# Patient Record
Sex: MALE | Race: WHITE | NOT HISPANIC OR LATINO | Employment: FULL TIME | URBAN - NONMETROPOLITAN AREA
[De-identification: names, ages, dates, MRNs, and addresses within clinical notes are randomized per-mention and may not be internally consistent; named-entity substitution may affect disease eponyms.]

---

## 2017-02-03 ENCOUNTER — HISTORY (OUTPATIENT)
Dept: FAMILY MEDICINE | Facility: OTHER | Age: 47
End: 2017-02-03

## 2017-02-03 ENCOUNTER — OFFICE VISIT - GICH (OUTPATIENT)
Dept: FAMILY MEDICINE | Facility: OTHER | Age: 47
End: 2017-02-03

## 2017-02-03 DIAGNOSIS — R31.9 HEMATURIA: ICD-10-CM

## 2017-02-03 DIAGNOSIS — Z00.00 ENCOUNTER FOR GENERAL ADULT MEDICAL EXAMINATION WITHOUT ABNORMAL FINDINGS: ICD-10-CM

## 2017-02-03 LAB
A/G RATIO - HISTORICAL: 1.6 (ref 1–2)
ALBUMIN SERPL-MCNC: 4.5 G/DL (ref 3.5–5.7)
ALP SERPL-CCNC: 43 IU/L (ref 34–104)
ALT (SGPT) - HISTORICAL: 37 IU/L (ref 7–52)
ANION GAP - HISTORICAL: 8 (ref 5–18)
AST SERPL-CCNC: 25 IU/L (ref 13–39)
BACTERIA URINE: NORMAL BACTERIA/HPF
BILIRUB SERPL-MCNC: 1.1 MG/DL (ref 0.3–1)
BILIRUB UR QL: NEGATIVE
BUN SERPL-MCNC: 14 MG/DL (ref 7–25)
BUN/CREAT RATIO - HISTORICAL: 15
CALCIUM SERPL-MCNC: 9.3 MG/DL (ref 8.6–10.3)
CHLORIDE SERPLBLD-SCNC: 101 MMOL/L (ref 98–107)
CHOL/HDL RATIO - HISTORICAL: 3.42
CHOLESTEROL TOTAL: 195 MG/DL
CLARITY, URINE: CLEAR CLARITY
CO2 SERPL-SCNC: 27 MMOL/L (ref 21–31)
COLOR UR: YELLOW COLOR
CREAT SERPL-MCNC: 0.92 MG/DL (ref 0.7–1.3)
EPITHELIAL CELLS: NORMAL EPI/HPF
GFR IF NOT AFRICAN AMERICAN - HISTORICAL: >60 ML/MIN/1.73M2
GLOBULIN - HISTORICAL: 2.8 G/DL (ref 2–3.7)
GLUCOSE SERPL-MCNC: 100 MG/DL (ref 70–105)
GLUCOSE URINE: NEGATIVE MG/DL
HDLC SERPL-MCNC: 57 MG/DL (ref 23–92)
KETONES UR QL: NEGATIVE MG/DL
LDLC SERPL CALC-MCNC: 118 MG/DL
LEUKOCYTE ESTERASE URINE: ABNORMAL
NITRITE UR QL STRIP: NEGATIVE
NON-HDL CHOLESTEROL - HISTORICAL: 138 MG/DL
OCCULT BLOOD,URINE - HISTORICAL: NEGATIVE
PATIENT STATUS - HISTORICAL: ABNORMAL
PH UR: 6 [PH]
POTASSIUM SERPL-SCNC: 4 MMOL/L (ref 3.5–5.1)
PROT SERPL-MCNC: 7.3 G/DL (ref 6.4–8.9)
PROTEIN QUALITATIVE,URINE - HISTORICAL: NEGATIVE MG/DL
RBC - HISTORICAL: NORMAL /HPF
SODIUM SERPL-SCNC: 136 MMOL/L (ref 133–143)
SP GR UR STRIP: 1.01
TRIGL SERPL-MCNC: 101 MG/DL
UROBILINOGEN,QUALITATIVE - HISTORICAL: NORMAL EU/DL
WBC - HISTORICAL: NORMAL /HPF

## 2017-02-03 ASSESSMENT — ANXIETY QUESTIONNAIRES
7. FEELING AFRAID AS IF SOMETHING AWFUL MIGHT HAPPEN: NOT AT ALL
3. WORRYING TOO MUCH ABOUT DIFFERENT THINGS: NOT AT ALL
GAD7 TOTAL SCORE: 0
2. NOT BEING ABLE TO STOP OR CONTROL WORRYING: NOT AT ALL
1. FEELING NERVOUS, ANXIOUS, OR ON EDGE: NOT AT ALL
6. BECOMING EASILY ANNOYED OR IRRITABLE: NOT AT ALL
4. TROUBLE RELAXING: NOT AT ALL
5. BEING SO RESTLESS THAT IT IS HARD TO SIT STILL: NOT AT ALL

## 2017-02-03 ASSESSMENT — PATIENT HEALTH QUESTIONNAIRE - PHQ9: SUM OF ALL RESPONSES TO PHQ QUESTIONS 1-9: 0

## 2017-02-06 ENCOUNTER — HISTORY (OUTPATIENT)
Dept: UROLOGY | Facility: OTHER | Age: 47
End: 2017-02-06

## 2017-02-06 ENCOUNTER — OFFICE VISIT - GICH (OUTPATIENT)
Dept: UROLOGY | Facility: OTHER | Age: 47
End: 2017-02-06

## 2017-02-06 DIAGNOSIS — R31.9 HEMATURIA: ICD-10-CM

## 2017-02-09 ENCOUNTER — HISTORY (OUTPATIENT)
Dept: UROLOGY | Facility: OTHER | Age: 47
End: 2017-02-09

## 2017-02-09 ENCOUNTER — ANTICOAGULATION - GICH (OUTPATIENT)
Dept: UROLOGY | Facility: OTHER | Age: 47
End: 2017-02-09

## 2017-02-09 ENCOUNTER — AMBULATORY - GICH (OUTPATIENT)
Dept: UROLOGY | Facility: OTHER | Age: 47
End: 2017-02-09

## 2017-02-09 ENCOUNTER — HOSPITAL ENCOUNTER (OUTPATIENT)
Dept: RADIOLOGY | Facility: OTHER | Age: 47
End: 2017-02-09
Attending: UROLOGY

## 2017-02-09 DIAGNOSIS — R31.9 HEMATURIA: ICD-10-CM

## 2017-02-23 ENCOUNTER — OFFICE VISIT - GICH (OUTPATIENT)
Dept: UROLOGY | Facility: OTHER | Age: 47
End: 2017-02-23

## 2017-02-23 ENCOUNTER — HISTORY (OUTPATIENT)
Dept: UROLOGY | Facility: OTHER | Age: 47
End: 2017-02-23

## 2017-02-23 DIAGNOSIS — R31.9 HEMATURIA: ICD-10-CM

## 2018-01-03 NOTE — PROGRESS NOTES
Patient Information     Patient Name MRN Sex Moses Rendon [id number] Male 1970      Progress Notes by Rosanna Wilson at 2017  7:50 AM     Author:  Rosanna Wilson Service:  (none) Author Type:  Other Clinical Staff     Filed:  2017  7:50 AM Date of Service:  2017  7:50 AM Status:  Signed     :  Rosanna Wilson (Other Clinical Staff)            1.  Has the patient had a previous reaction to IV contrast? No    2.  Does the patient have kidney disease? No    3.  Is the patient on dialysis? No    If YES to any of these questions, exam will be reviewed with a Radiologist before administering contrast.

## 2018-01-03 NOTE — NURSING NOTE
Patient Information     Patient Name MRN Moses Malik [id number] Male 1970      Nursing Note by Britney Cardenas at 2/3/2017  3:45 PM     Author:  Britney Cardenas Service:  (none) Author Type:  (none)     Filed:  2/3/2017  3:54 PM Encounter Date:  2/3/2017 Status:  Signed     :  Britney Cardenas            Patient here for physical, blood in the urine started yesterday. Pain on the right side today, no fevers. Last eye exam over 5 years and last dental exam 3 months ago. Britney Cardenas LPN .......................2/3/2017  3:48 PM

## 2018-01-03 NOTE — NURSING NOTE
Patient Information     Patient Name MRN Sex Moses Rendon [id number] Male 1970      Nursing Note by Ana Navarrete RN at 2017  2:30 PM     Author:  Ana Navarrete RN Service:  (none) Author Type:  NURS- Registered Nurse     Filed:  2017  2:38 PM Encounter Date:  2017 Status:  Signed     :  Ana Navarrete RN (NURS- Registered Nurse)            Review of Systems:    Weight loss:    No     Recent fever/chills:  No   Night sweats:   No  Current skin rash:  No   Recent hair loss:  No  Heat intolerance:  No   Cold intolerance:  No  Chest pain:   No   Palpitations:   No  Shortness of breath:  No   Wheezing:   Yes  Constipation:    No   Diarrhea:   No   Nausea:   No   Vomiting:   No   Kidney/side pain:  Yes   Back pain:   No  Frequent headaches:  No   Dizziness:     No  Leg swelling:   No   Calf pain:    No

## 2018-01-03 NOTE — NURSING NOTE
Patient Information     Patient Name MRN Sex Moses Rendon [id number] Male 1970      Nursing Note by Angela Westbrook RN at 2017  8:45 AM     Author:  Angela Westbrook RN Service:  (none) Author Type:  NURS- Registered Nurse     Filed:  2017  8:36 AM Encounter Date:  2017 Status:  Signed     :  Angela Westbrook RN (NURS- Registered Nurse)            Patient positioned in supine position, perineum area prepped with chlorhexidene Gluconate and patient draped per sterile technique. Per verbal order read back by Ignacio Smith MD, Urojet 10mL 2% lidocaine jelly to be instilled into urethra.  Urojet- 10ml 2% Lidocaine jelly instilled into the urethra.    Urojet 2%  Lot#: [id number]  Expiration date: 2018  : [employer]  NDC: [address]-2    [place] Protocol    A. Pre-procedure verification complete yes  1-relevant information / documentation available, reviewed and properly matched to the patient; 2-consent accurate and complete, 3-equipment and supplies available    B. Site marking complete N/A  Site marked if not in continuous attendance with patient    C. TIME OUT completed yes  Time Out was conducted just prior to starting procedure to verify the eight required elements: 1-patient identity, 2-consent accurate and complete, 3-position, 4-correct side/site marked (if applicable), 5-procedure, 6-relevant images / results properly labeled and displayed (if applicable), 7-antibiotics / irrigation fluids (if applicable), 8-safety precautions.    After procedure perineum area rinsed. Discharge instructions reviewed with patient. Patient verbalized understanding of discharge instructions and discharged ambulatory.

## 2018-01-03 NOTE — PROGRESS NOTES
Patient Information     Patient Name MRN Sex Moses Rendon [id number] Male 1970      Progress Notes by Rosanna Wilson at 2017  7:50 AM     Author:  Rosanna Wilson Service:  (none) Author Type:  Other Clinical Staff     Filed:  2017  7:50 AM Date of Service:  2017  7:50 AM Status:  Signed     :  Rosanna Wilson (Other Clinical Staff)            IV Contrast- Discharge Instructions After Your CT Scan      The IV contrast you received today will be filtered from your bloodstream by your kidneys during the next 24 hours and pass from the body in urine.  You will not be aware of this process and your urine will not change in color.  To help this process you should drink at least 4 additional glasses of water or juice today.  This reduces stress on your kidneys.    Most contrast reactions are immediate.  Should you develop symptoms of concern after discharge, contact the department at the number below.  After hours you should contact your personal physician.  If you develop breathing distress or wheezing, call 911.

## 2018-01-03 NOTE — PROGRESS NOTES
"Patient Information     Patient Name MRN Sex Moses Rendon [id number] Male 1970      Progress Notes by Patrick Bell MD at 2/3/2017  3:45 PM     Author:  Patrick Bell MD Service:  (none) Author Type:  Physician     Filed:  2017  2:20 PM Encounter Date:  2/3/2017 Status:  Signed     :  Patrick Bell MD (Physician)            SUBJECTIVE:    Moses Navarro is a 46 y.o. male who presents for physical    HPI Comments: Patient arrives here for a physical but also reports a case of hematuria. Patient states a few days ago he had \"blood in the urine\". States  reports that there was complete legally red. Settled in the toilet. He had not eaten any beats or taken any medications. Patient denies any pain. He denies any trauma.       No Known Allergies,   Family History       Problem   Relation Age of Onset     Diabetes  Father      type II       Hypertension  Father      Other  Father      a fib       Good Health  Mother      Good Health  Sister    ,   No current outpatient prescriptions on file prior to visit.     No current facility-administered medications on file prior to visit.    ,   Past Surgical History       Procedure   Laterality Date     Pr subtalar athroereisis        Ep study /ablation   2011     Atrial Fibrillation       Angiogram hand        angiogram right hand        Knee arthroscopy        Right knee     ,   Social History      Substance Use Topics        Smoking status:  Never Smoker     Smokeless tobacco:  Never Used     Alcohol use  3.6 oz/week     6 Cans of beer per week     and   Social History      Substance Use Topics        Smoking status:  Never Smoker     Smokeless tobacco:  Never Used     Alcohol use  3.6 oz/week     6 Cans of beer per week        REVIEW OF SYSTEMS:  ROS    OBJECTIVE:  /88  Pulse 56  Ht 1.816 m (5' 11.5\")  Wt 93.5 kg (206 lb 3.2 oz)  BMI 28.36 kg/m2    EXAM:   Physical Exam   Constitutional: He is well-developed, well-nourished, and " in no distress.   HENT:   Head: Normocephalic and atraumatic.   Right Ear: External ear normal.   Left Ear: External ear normal.   Mouth/Throat: No oropharyngeal exudate.   Eyes: Conjunctivae are normal. Pupils are equal, round, and reactive to light.   Neck: Normal range of motion.   Cardiovascular: Normal rate, regular rhythm and normal heart sounds.    No murmur heard.  Pulmonary/Chest: Effort normal and breath sounds normal.   Abdominal: Soft. Bowel sounds are normal.   Genitourinary: Rectum normal, prostate normal and penis normal.   Genitourinary Comments: No hernia   Musculoskeletal: Normal range of motion.   Neurological: He is alert. Gait normal.   Psychiatric: Affect normal.     Results for orders placed or performed in visit on 02/03/17       URINALYSIS W REFLEX MICROSCOPIC IF POSITIVE       Result  Value Ref Range Status    COLOR                     Yellow Yellow Color Final    CLARITY                   Clear Clear Clarity Final    SPECIFIC GRAVITY,URINE    1.010 1.010, 1.015, 1.020, 1.025                 Final    PH,URINE                  6.0 6.0, 7.0, 8.0, 5.5, 6.5, 7.5, 8.5                 Final    UROBILINOGEN,QUALITATIVE  Normal Normal EU/dl Final    PROTEIN, URINE Negative Negative mg/dL Final    GLUCOSE, URINE Negative Negative mg/dL Final    KETONES,URINE             Negative Negative mg/dL Final    BILIRUBIN,URINE           Negative Negative                 Final    OCCULT BLOOD,URINE        Negative Negative                 Final    NITRITE                   Negative Negative                 Final    LEUKOCYTE ESTERASE        Trace (A) Negative                 Final   URINALYSIS MICROSCOPIC       Result  Value Ref Range Status    RBC 0-2 0-2, None Seen /HPF Final    WBC 3-5 0-2, 3-5, None Seen /HPF Final    BACTERIA                  Few None Seen, Rare, Occasional, Few Bacteria/HPF Final    EPITHELIAL CELLS          Few None Seen, Few Epi/HPF Final   LIPID PANEL       Result  Value Ref Range  Status    CHOLESTEROL,TOTAL 195 <200 mg/dL Final    TRIGLYCERIDES 101 <150 mg/dL Final    HDL CHOLESTEROL 57 23 - 92 mg/dL Final    NON-HDL CHOLESTEROL 138 <145 mg/dl Final    CHOL/HDL RATIO            3.42 <4.50                 Final    LDL CHOLESTEROL 118 (H) <100 mg/dL Final    PATIENT STATUS            NOT GIVEN                 Final   COMP METABOLIC PANEL       Result  Value Ref Range Status    SODIUM 136 133 - 143 mmol/L Final    POTASSIUM 4.0 3.5 - 5.1 mmol/L Final    CHLORIDE 101 98 - 107 mmol/L Final    CO2,TOTAL 27 21 - 31 mmol/L Final    ANION GAP 8 5 - 18                 Final    GLUCOSE 100 70 - 105 mg/dL Final    CALCIUM 9.3 8.6 - 10.3 mg/dL Final    BUN 14 7 - 25 mg/dL Final    CREATININE 0.92 0.70 - 1.30 mg/dL Final    BUN/CREAT RATIO           15                 Final    GFR if African American >60 >60 ml/min/1.73m2 Final    GFR if not African American >60 >60 ml/min/1.73m2 Final    ALBUMIN 4.5 3.5 - 5.7 g/dL Final    PROTEIN,TOTAL 7.3 6.4 - 8.9 g/dL Final    GLOBULIN                  2.8 2.0 - 3.7 g/dL Final    A/G RATIO 1.6 1.0 - 2.0                 Final    BILIRUBIN,TOTAL 1.1 (H) 0.3 - 1.0 mg/dL Final    ALK PHOSPHATASE 43 34 - 104 IU/L Final    ALT (SGPT) 37 7 - 52 IU/L Final    AST (SGOT) 25 13 - 39 IU/L Final       ASSESSMENT/PLAN:    ICD-10-CM    1. Physical exam Z00.00 LIPID PANEL      COMP METABOLIC PANEL      LIPID PANEL      COMP METABOLIC PANEL   2. Hematuria R31.9 URINALYSIS W REFLEX MICROSCOPIC IF POSITIVE      URINALYSIS W REFLEX MICROSCOPIC IF POSITIVE      URINALYSIS MICROSCOPIC      URINALYSIS MICROSCOPIC      URINALYSIS W REFLEX MICROSCOPIC IF POSITIVE      AMB CONSULT TO UROLOGY        Plan:  Labs reviewed and satisfactory.  Even though the patient does not have any hematuria in the urine he certainly gives a good history for sudden onset of hematuria. We'll get a consultation with urology. Copies of labs will be sent to patient.

## 2018-01-03 NOTE — PROGRESS NOTES
Patient Information     Patient Name MRN Moses Malik [id number] Male 1970      Progress Notes by Ignacio Smith MD at 2017  2:30 PM     Author:  Ignacio Smith MD Service:  (none) Author Type:  Physician     Filed:  2017  3:25 PM Encounter Date:  2017 Status:  Signed     :  Ignacio Smith MD (Physician)            Type of Visit  EST    Chief Complaint  Hematuria    HPI  Mr. Navarro is a 46 y.o. male who follows up with gross hematuria following intercourse.  Gross hematuria started 3 weeks ago.  Episodes last about 1 day and then resolved spontaneously.  Total episodes are about 5.  Recently underwent a work up which was negative including a CTU and cystoscopy.    Patient denies associated dysuria at the time of onset.  Patient denies clots associated with hematuria.    Hematuria-related signs/symptoms  History of smoking?    No  History of chemotherapy?   No  History of pelvic radiation?   No  History of kidney or bladder stones?  No  History of frequent urinary tract infections? No      Family History  Family History       Problem   Relation Age of Onset     Diabetes  Father      type II       Hypertension  Father      Other  Father      a fib       Good Health  Mother      Good Health  Sister        Review of Systems  I personally reviewed the ROS with the patient.    Nursing Notes:   Ana Navarrete RN  2017  2:38 PM  Signed  Review of Systems:    Weight loss:    No     Recent fever/chills:  No   Night sweats:   No  Current skin rash:  No   Recent hair loss:  No  Heat intolerance:  No   Cold intolerance:  No  Chest pain:   No   Palpitations:   No  Shortness of breath:  No   Wheezing:   Yes  Constipation:    No   Diarrhea:   No   Nausea:   No   Vomiting:   No   Kidney/side pain:  Yes   Back pain:   No  Frequent headaches:  No   Dizziness:     No  Leg swelling:   No   Calf pain:    No      Physical Exam  Vitals:     17 1436   BP: 126/82   Pulse: 76   Resp: 12  "  Weight: 93.8 kg (206 lb 12.8 oz)   Height: 1.803 m (5' 11\")     Constitutional: No acute distress.  Alert and cooperative   Head: NCAT  Eyes: Conjunctivae normal  Cardiovascular: Regular rate.  Pulmonary/Chest: Respirations are even and non-labored bilaterally, no audible wheezing  Abdominal: Soft. No distension, tenderness, masses or guarding.   Neurological: A + O x 3.  Cranial Nerves II-XII grossly intact.  Extremities: RAUL x 4, Warm. No clubbing.  No cyanosis.    Skin: Pink, warm and dry.  No visible rashes noted.  Psychiatric:  Normal mood and affect  Back:  No left CVA tenderness.  No right CVA tenderness.  Genitourinary:  Nonpalpable bladder    Labs  CREATININE (mg/dL)    Date Value   02/03/2017 0.92       Recent Labs       02/03/17   1602   COLOR  Yellow   CLARITY  Clear   SPECGRAV  1.010   PHURINE  6.0   UROBILINOGEN  Normal   PROTEINUA  Negative       Recent Labs       02/03/17   1602   GLUCOSEUA  Negative   KETONESUA  Negative   BLOODUA  Negative   NITRITE  Negative   LEUKOCYTE  Trace A     Lab Results      Component  Value Date/Time    WBCUA 3-5 02/03/2017 04:02 PM    RBCUA 0-2 02/03/2017 04:02 PM    BACTERIAUA Few 02/03/2017 04:02 PM    EPITHELIALUA Few 02/03/2017 04:02 PM       Imaging  CTU  2/9/2017  IMPRESSION: No findings to explain the patient's hematuria.    Assessment  Mr. Navarro is a 46 y.o. male with post coital gross hematuria  Discussed likely etiology of gross hematuria following ejaculation.    Plan  No ejaculation x 2 weeks        "

## 2018-01-03 NOTE — PROGRESS NOTES
Patient Information     Patient Name MRN Sex Moses Rendon [id number] Male 1970      Progress Notes by Ignacio Smith MD at 2017  8:45 AM     Author:  Ignacio Smith MD Service:  (none) Author Type:  Physician     Filed:  2017  8:42 AM Encounter Date:  2017 Status:  Signed     :  Ignacio Smith MD (Physician)            Preprocedure diagnosis  Hematuria    Postprocedure diagnosis  Hematuria    Procedure  Flexible Cystourethroscopy    Surgeon  Ignacio Smith MD    Anesthesia  2% lidocaine jelly intraurethrally    Complications  None    Indications  46 y.o. male undergoing a flexible cystoscopy for the above mentioned indications.    Findings  Cystoscopic findings included a normal anterior urethra.    There was not a prominent median lobe.    The lateral lobes were not obstructive in appearance.  The bladder appeared to be normal capacity.    There were no tumors, stones or foreign bodies.    The orifices were slit-shaped and in their normal location.    Procedure  The patient was placed in supine position and prepped and draped in sterile fashion with lidocaine jelly per urethra for anesthesia.    I passed a lubricated 14F flexible cystoscope through the penile urethra and into the bladder and the bladder was completely visualized.  The cystoscope was retroflexed and the bladder neck and prostate visualized.    The cystoscope was slowly withdrawn while visualizing the urethra and the procedure terminated.    The patient tolerated the procedure well.      Plan  Reassurance

## 2018-01-03 NOTE — PATIENT INSTRUCTIONS
Patient Information     Patient Name Moses Roman [id number] Male 1970      Patient Instructions by Angela Westbrook RN at 2017  8:45 AM     Author:  Angela Westbrook RN Service:  (none) Author Type:  NURS- Registered Nurse     Filed:  2017  8:19 AM Encounter Date:  2017 Status:  Signed     :  Angela Westbrook RN (NURS- Registered Nurse)            Home Care after Cystoscopy  Follow these guidelines for your care after your procedure.    Activity  No limitations    Bathing or showering  No limitations    Symptoms  You may notice some burning with urination but this usually resolves after 1-2 days.  You may also notice small amounts of blood in your urine.  Please increase water intake for the next few days to help with these symptoms.    Contacts  General Questions: [phone]  Appointments:  [phone]  Emergencies:  [phone]    When to call the clinic  If you develop any of the following symptoms please call the clinic immediately.  If the clinic is closed please be seen at an urgent care clinic or the Emergency Department.  - Burning with urination that worsens after 2 days  - Unable to urinate causing severe pelvic pain  - Fevers of greater than 101 degrees F  - Flank pain that is not responding to pain medication    Follow up  Please follow up as discussed at the appointment.

## 2018-01-03 NOTE — PROGRESS NOTES
Patient Information     Patient Name MRMoses Ham [id number] Male 1970      Progress Notes by Ignacio Smith MD at 2017  1:15 PM     Author:  Ignacio Smith MD Service:  (none) Author Type:  Physician     Filed:  2017  2:08 PM Encounter Date:  2017 Status:  Signed     :  Ignacio Smith MD (Physician)            I was asked to see this patient by Dr Bell and provide my opinion about the following:  Hematuria    Type of Visit  Consult    Chief Complaint  Hematuria    HPI  Mr. Navarro is a 46 y.o. male who presents with hematuria.  Gross hematuria started 5 day(s) ago.  Episode lasted about 1 day and then resolved spontaneously.  This has happened one additional time since then.  Associated with intercourse.  He brought in a container of grossly bloody urine today to show me.  Patient denies associated dysuria at the time of onset.  Patient denies clots associated with hematuria.    Hematuria-related signs/symptoms  History of smoking?    No  History of chemotherapy?   No  History of pelvic radiation?   No  History of kidney or bladder stones?  No  History of frequent urinary tract infections? No      Past Medical History  He  has a past medical history of Atrial fibrillation and flutter (since 2011); Injury; Injury; Rotator cuff injury; and Ruptured spleen.  Patient Active Problem List     Diagnosis  Code     ATRIAL FLUTTER, PAROXYSMAL status post ablation I48.92     Crush injury of hand S67.20XA     INSOMNIA, PERSISTENT G47.00     SNORING R06.09, R09.89     Hematuria R31.9       Past Surgical History  He  has a past surgical history that includes subtalar athroereisis; ep study /ablation (2011); ANGIOGRAM HAND; and knee arthroscopy.    Medications  None    Allergies  No Known Allergies    Social History  He  reports that he has never smoked. He has never used smokeless tobacco. He reports that he drinks about 3.6 oz of alcohol per week  He reports that he does not use  illicit drugs.  No drug abuse.    Family History  Family History       Problem   Relation Age of Onset     Diabetes  Father      type II       Hypertension  Father      Other  Father      a fib       Good Health  Mother      Good Health  Sister        Review of Systems  I personally reviewed the ROS with the patient.    Nursing Notes:   Asia Prescott  2/6/2017  1:31 PM  Signed  Here for Hematuria..  Review of Systems:    Weight loss:    No     Recent fever/chills:  Yes   Night sweats:   No  Current skin rash:  No   Recent hair loss:  No  Heat intolerance:  No   Cold intolerance:  No  Chest pain:   No   Palpitations:   No  Shortness of breath:  No   Wheezing:   No  Constipation:    No   Diarrhea:   No   Nausea:   No   Vomiting:   No   Kidney/side pain:  No   Back pain:   No  Frequent headaches:  No   Dizziness:     No  Leg swelling:   No   Calf pain:    No        Parents, brothers or sisters with history of kidney cancer?   No  Parents, brothers or sisters with history of bladder cancer: No  Asia Prescott LPN  2/6/2017  1:18 PM            Physical Exam  Vitals:     02/06/17 1316   BP: 120/70   Resp: 12   Weight: 93.4 kg (206 lb)     Constitutional: No acute distress.  Alert and cooperative   Head: NCAT  Eyes: Conjunctivae normal  Cardiovascular: Regular rate.  Pulmonary/Chest: Respirations are even and non-labored bilaterally, no audible wheezing  Abdominal: Soft. No distension, tenderness, masses or guarding.   Neurological: A + O x 3.  Cranial Nerves II-XII grossly intact.  Extremities: RAUL x 4, Warm. No clubbing.  No cyanosis.    Skin: Pink, warm and dry.  No visible rashes noted.  Psychiatric:  Normal mood and affect  Back:  No left CVA tenderness.  No right CVA tenderness.  Genitourinary:  Nonpalpable bladder    Labs  CREATININE (mg/dL)    Date Value   02/03/2017 0.92       Recent Labs       02/03/17   1602   COLOR  Yellow   CLARITY  Clear   SPECGRAV  1.010   PHURINE  6.0   UROBILINOGEN  Normal    PROTEINUA  Negative       Recent Labs       02/03/17   1602   GLUCOSEUA  Negative   KETONESUA  Negative   BLOODUA  Negative   NITRITE  Negative   LEUKOCYTE  Trace A     Lab Results      Component  Value Date/Time    WBCUA 3-5 02/03/2017 04:02 PM    RBCUA 0-2 02/03/2017 04:02 PM    BACTERIAUA Few 02/03/2017 04:02 PM    EPITHELIALUA Few 02/03/2017 04:02 PM       Imaging  None    Assessment  Mr. Navarro is a 46 y.o. male with gross hematuria    Discussed rationale for work up.  Discussed potential findings of hematuria work up including, but not limited to, kidney stones, bladder tumors and/or kidney tumors.  Also discussed the potential for a normal work up.    Plan  CT Urogram with follow up for cystoscopy

## 2018-01-03 NOTE — NURSING NOTE
Patient Information     Patient Name MRMoses Ham [id number] Male 1970      Nursing Note by Asia Prescott at 2017  1:15 PM     Author:  Asia Prescott Service:  (none) Author Type:  (none)     Filed:  2017  1:31 PM Encounter Date:  2017 Status:  Signed     :  Asia Prescott            Here for Hematuria..  Review of Systems:    Weight loss:    No     Recent fever/chills:  Yes   Night sweats:   No  Current skin rash:  No   Recent hair loss:  No  Heat intolerance:  No   Cold intolerance:  No  Chest pain:   No   Palpitations:   No  Shortness of breath:  No   Wheezing:   No  Constipation:    No   Diarrhea:   No   Nausea:   No   Vomiting:   No   Kidney/side pain:  No   Back pain:   No  Frequent headaches:  No   Dizziness:     No  Leg swelling:   No   Calf pain:    No        Parents, brothers or sisters with history of kidney cancer?   No  Parents, brothers or sisters with history of bladder cancer: No  Asia Prescott LPN  2017  1:18 PM

## 2018-01-24 ENCOUNTER — HISTORY (OUTPATIENT)
Dept: FAMILY MEDICINE | Facility: OTHER | Age: 48
End: 2018-01-24

## 2018-01-24 ENCOUNTER — OFFICE VISIT - GICH (OUTPATIENT)
Dept: FAMILY MEDICINE | Facility: OTHER | Age: 48
End: 2018-01-24

## 2018-01-24 DIAGNOSIS — G89.29 OTHER CHRONIC PAIN: ICD-10-CM

## 2018-01-24 DIAGNOSIS — M54.41 LOW BACK PAIN WITH RIGHT-SIDED SCIATICA: ICD-10-CM

## 2018-01-24 DIAGNOSIS — L21.9 SEBORRHEIC DERMATITIS: ICD-10-CM

## 2018-01-26 VITALS
SYSTOLIC BLOOD PRESSURE: 126 MMHG | SYSTOLIC BLOOD PRESSURE: 120 MMHG | DIASTOLIC BLOOD PRESSURE: 82 MMHG | RESPIRATION RATE: 12 BRPM | HEIGHT: 71 IN | WEIGHT: 206.8 LBS | WEIGHT: 206 LBS | BODY MASS INDEX: 28.95 KG/M2 | RESPIRATION RATE: 12 BRPM | BODY MASS INDEX: 28.33 KG/M2 | DIASTOLIC BLOOD PRESSURE: 70 MMHG | HEART RATE: 76 BPM

## 2018-01-26 VITALS
WEIGHT: 206.2 LBS | HEIGHT: 72 IN | BODY MASS INDEX: 27.93 KG/M2 | SYSTOLIC BLOOD PRESSURE: 134 MMHG | DIASTOLIC BLOOD PRESSURE: 88 MMHG | HEART RATE: 56 BPM

## 2018-01-26 VITALS — HEIGHT: 71 IN | HEART RATE: 72 BPM | RESPIRATION RATE: 16 BRPM | WEIGHT: 207 LBS | BODY MASS INDEX: 28.98 KG/M2

## 2018-02-02 ASSESSMENT — PATIENT HEALTH QUESTIONNAIRE - PHQ9: SUM OF ALL RESPONSES TO PHQ QUESTIONS 1-9: 0

## 2018-02-02 ASSESSMENT — ANXIETY QUESTIONNAIRES: GAD7 TOTAL SCORE: 0

## 2018-02-09 VITALS
BODY MASS INDEX: 29.43 KG/M2 | SYSTOLIC BLOOD PRESSURE: 130 MMHG | TEMPERATURE: 97.6 F | WEIGHT: 211 LBS | DIASTOLIC BLOOD PRESSURE: 84 MMHG

## 2018-02-13 ENCOUNTER — DOCUMENTATION ONLY (OUTPATIENT)
Dept: FAMILY MEDICINE | Facility: OTHER | Age: 48
End: 2018-02-13

## 2018-02-13 PROBLEM — R31.9 HEMATURIA: Status: ACTIVE | Noted: 2017-02-03

## 2018-02-13 NOTE — NURSING NOTE
Patient Information     Patient Name MRN Moses Malik [id number] Male 1970      Nursing Note by Mary Conteh at 2018  3:45 PM     Author:  Mary Conteh Service:  (none) Author Type:  (none)     Filed:  2018  3:49 PM Encounter Date:  2018 Status:  Signed     :  Mary Conteh            Patient presents to the clinic with chronic back pain.  Mary Conteh LPN....................2018 3:43 PM

## 2018-02-13 NOTE — PROGRESS NOTES
Patient Information     Patient Name MRN Sex Moses Rendon [id number] Male 1970      Progress Notes by Patrick Bell MD at 2018  3:45 PM     Author:  Patrick Bell MD Service:  (none) Author Type:  Physician     Filed:  2018  7:41 AM Encounter Date:  2018 Status:  Signed     :  Patrick Bell MD (Physician)            SUBJECTIVE:    Moses Navarro is a 47 y.o. male who presents for lower back pain and rash    HPI Comments: Patient arrives here for lower back pain and rash. He's had a lower back pain for about a year and seems to be slowly getting worse. He's been trying stretching exercises at home. Occasional ibuprofen. He denies any injury. He does report pain going down the right leg. But he denies any weakness no changes in bowel or bladder habits. States his right butt hurts. He has sole has a rash on his right lower lid      No Known Allergies,   Family History       Problem   Relation Age of Onset     Diabetes  Father      type II       Hypertension  Father      Other  Father      a fib       Good Health  Mother      Good Health  Sister     and   No current outpatient prescriptions on file prior to visit.     No current facility-administered medications on file prior to visit.        REVIEW OF SYSTEMS:  ROS    OBJECTIVE:  /84 (Cuff Site: Right Arm, Position: Sitting, Cuff Size: Adult Large)  Temp 97.6  F (36.4  C) (Temporal)  Wt 95.7 kg (211 lb)  BMI 29.43 kg/m2    EXAM:   Physical Exam   Constitutional: He is well-developed, well-nourished, and in no distress.   Musculoskeletal: He exhibits no tenderness.   Able to walk on toes and heels squats without difficulty.   Neurological: He is alert. He displays normal reflexes. He exhibits normal muscle tone.   Skin:   Small scaling rash below the right lower lid.       ASSESSMENT/PLAN:    ICD-10-CM    1. Chronic right-sided low back pain with right-sided sciatica M54.41 etodolac (LODINE) 500 mg tablet      G89.29 AMB CONSULT TO PHYSICAL THERAPY   2. Seborrhea L21.9         Plan:  Physical therapy along with the Lodine. It appears his lesion below his right lower lid is seborrhea. Lungs recommend Cortaid. If no improvement follow-up. Follow-up after physical therapy completed.

## 2018-07-23 NOTE — PROGRESS NOTES
Patient Information     Patient Name  Moses Navarro MRN  [id number] Sex  Male   1970      Letter by Patrick Bell MD at      Author:  Patrick Bell MD Service:  (none) Author Type:  (none)    Filed:   Encounter Date:  2/3/2017 Status:  (Other)           Moses Navarro  [address] [facility] [place] [id number]          2017    Dear Mr. Navarro:    Enclosed is a copy of your laboratory testing which did come back satisfactory. Please call if you should have any questions.  Results for orders placed or performed in visit on 17       URINALYSIS W REFLEX MICROSCOPIC IF POSITIVE       Result  Value Ref Range Status    COLOR                     Yellow Yellow Color Final    CLARITY                   Clear Clear Clarity Final    SPECIFIC GRAVITY,URINE    1.010 1.010, 1.015, 1.020, 1.025                 Final    PH,URINE                  6.0 6.0, 7.0, 8.0, 5.5, 6.5, 7.5, 8.5                 Final    UROBILINOGEN,QUALITATIVE  Normal Normal EU/dl Final    PROTEIN, URINE Negative Negative mg/dL Final    GLUCOSE, URINE Negative Negative mg/dL Final    KETONES,URINE             Negative Negative mg/dL Final    BILIRUBIN,URINE           Negative Negative                 Final    OCCULT BLOOD,URINE        Negative Negative                 Final    NITRITE                   Negative Negative                 Final    LEUKOCYTE ESTERASE        Trace (A) Negative                 Final   URINALYSIS MICROSCOPIC       Result  Value Ref Range Status    RBC 0-2 0-2, None Seen /HPF Final    WBC 3-5 0-2, 3-5, None Seen /HPF Final    BACTERIA                  Few None Seen, Rare, Occasional, Few Bacteria/HPF Final    EPITHELIAL CELLS          Few None Seen, Few Epi/HPF Final   LIPID PANEL       Result  Value Ref Range Status    CHOLESTEROL,TOTAL 195 <200 mg/dL Final    TRIGLYCERIDES 101 <150 mg/dL Final    HDL CHOLESTEROL 57 23 - 92 mg/dL Final    NON-HDL CHOLESTEROL 138 <145 mg/dl Final     CHOL/HDL RATIO            3.42 <4.50                 Final    LDL CHOLESTEROL 118 (H) <100 mg/dL Final    PATIENT STATUS            NOT GIVEN                 Final   COMP METABOLIC PANEL       Result  Value Ref Range Status    SODIUM 136 133 - 143 mmol/L Final    POTASSIUM 4.0 3.5 - 5.1 mmol/L Final    CHLORIDE 101 98 - 107 mmol/L Final    CO2,TOTAL 27 21 - 31 mmol/L Final    ANION GAP 8 5 - 18                 Final    GLUCOSE 100 70 - 105 mg/dL Final    CALCIUM 9.3 8.6 - 10.3 mg/dL Final    BUN 14 7 - 25 mg/dL Final    CREATININE 0.92 0.70 - 1.30 mg/dL Final    BUN/CREAT RATIO           15                 Final    GFR if African American >60 >60 ml/min/1.73m2 Final    GFR if not African American >60 >60 ml/min/1.73m2 Final    ALBUMIN 4.5 3.5 - 5.7 g/dL Final    PROTEIN,TOTAL 7.3 6.4 - 8.9 g/dL Final    GLOBULIN                  2.8 2.0 - 3.7 g/dL Final    A/G RATIO 1.6 1.0 - 2.0                 Final    BILIRUBIN,TOTAL 1.1 (H) 0.3 - 1.0 mg/dL Final    ALK PHOSPHATASE 43 34 - 104 IU/L Final    ALT (SGPT) 37 7 - 52 IU/L Final    AST (SGOT) 25 13 - 39 IU/L Final     Patrick Bell MD ....................  2/5/2017   2:29 PM

## 2019-04-29 ENCOUNTER — OFFICE VISIT (OUTPATIENT)
Dept: FAMILY MEDICINE | Facility: OTHER | Age: 49
End: 2019-04-29
Attending: FAMILY MEDICINE
Payer: COMMERCIAL

## 2019-04-29 VITALS
WEIGHT: 201 LBS | HEART RATE: 78 BPM | DIASTOLIC BLOOD PRESSURE: 84 MMHG | BODY MASS INDEX: 28.03 KG/M2 | RESPIRATION RATE: 16 BRPM | TEMPERATURE: 97.8 F | SYSTOLIC BLOOD PRESSURE: 142 MMHG

## 2019-04-29 DIAGNOSIS — M54.16 RIGHT LUMBAR RADICULOPATHY: Primary | ICD-10-CM

## 2019-04-29 DIAGNOSIS — G47.8 WAKES UP DURING NIGHT: ICD-10-CM

## 2019-04-29 PROCEDURE — 99214 OFFICE O/P EST MOD 30 MIN: CPT | Performed by: FAMILY MEDICINE

## 2019-04-29 RX ORDER — AMITRIPTYLINE HYDROCHLORIDE 10 MG/1
10-20 TABLET ORAL AT BEDTIME
Qty: 60 TABLET | Refills: 1 | Status: SHIPPED | OUTPATIENT
Start: 2019-04-29 | End: 2020-02-03

## 2019-04-29 ASSESSMENT — PAIN SCALES - GENERAL: PAINLEVEL: MILD PAIN (2)

## 2019-04-29 NOTE — PATIENT INSTRUCTIONS
Ordered MRI on low back. [facility] calls to schedule.  We can let you know results  Referral to physical therapy to work on improving pain and function    Try amitriptyline 10 mg each night for a week, if not effective, then increase to 20 mg each night  Call if side effects or questions or if not effective  Follow-up in 1-2 months if working

## 2019-04-29 NOTE — NURSING NOTE
Pt presents to clinic today for ongoing right sided low back pain that radiated down his leg.      Medication Reconciliation: complete  Ana Peres LPN

## 2019-04-29 NOTE — PROGRESS NOTES
"Nursing Notes:   Ana Peres, LPN  4/29/2019  4:13 PM  Signed  Pt presents to clinic today for ongoing right sided low back pain that radiated down his leg.      Medication Reconciliation: complete  Ana Peres LPN     SUBJECTIVE:  48 year old male presents for right leg numbness for a couple years. It has worsened over time. Has a \"tight lower back.\" Pain will go to knee. Has tingling in foot at times. Still working out and plays [employer]. No previous lumbar injury or surgery. Has not noticed weakness in leg. Has not tried chiropractor or PT.    Also, wondering about trying something for insomnia. He has used Ambien in the past, but it did nothing. He falls asleep fine, but wakes up often in the night. Cannot say waking is from back pain though.       REVIEW OF SYSTEMS:    Constitutional: negative  Respiratory: negative  Cardiovascular: negative    No current outpatient medications on file.     No Known Allergies    OBJECTIVE:  /84   Pulse 78   Temp 97.8  F (36.6  C) (Tympanic)   Resp 16   Wt 91.2 kg (201 lb)   BMI 28.03 kg/m      EXAM:  General Appearance: Pleasant, alert, appropriate appearance for age. No acute distress  Musculoskeletal Exam: Lumbar Spine: tenderness over lumbar paraspinous muscles on the right. No SI tenderness.  Neurologic Exam: reflexes 2+, strength symmetric lower extremities; SLR negative bilaterally  Psychiatric: Normal affect and mentation      ASSESSMENT/PLAN:    ICD-10-CM    1. Right lumbar radiculopathy M54.16 MR Lumbar Spine w/o Contrast     PHYSICAL THERAPY REFERRAL     amitriptyline (ELAVIL) 10 MG tablet   2. Wakes up during night G47.8 amitriptyline (ELAVIL) 10 MG tablet       Sounds to have a lumbar radiculopathy, but pain is proximal and numbness is distal. He could have some facet arthropathy and peripheral neuropathy, but more likely nerve impingement explains symptoms. We discussed PT or chiropractor as beneficial. He exercises and may do very well with PT " exercises along with usual workouts. Referral placed to PT.    Since symptoms are not typical recommend MRI to investigate further and see if there is concerning nerve impingement.    As for sleep, we reviewed some options. Best for night wakenings seems to be something like doxepin. Since he has back pain, may benefit from other TCA like amitriptyline. Start 10 mg at bedtime and work up to 20 mg. If not effective, we can change doses and work up further.     F/U 1-2 months    Greater than 50% of this 25 minute appointment spent on counseling       Jose M De La Cruz MD    This document was prepared using a combination of typing and voice generated software.  While every attempt was made for accuracy, spelling and grammatical errors may exist.

## 2019-04-30 ENCOUNTER — HOSPITAL ENCOUNTER (OUTPATIENT)
Dept: MRI IMAGING | Facility: OTHER | Age: 49
Discharge: HOME | End: 2019-04-30
Attending: FAMILY MEDICINE | Admitting: FAMILY MEDICINE
Payer: COMMERCIAL

## 2019-04-30 DIAGNOSIS — M54.16 RIGHT LUMBAR RADICULOPATHY: ICD-10-CM

## 2019-04-30 PROCEDURE — 72148 MRI LUMBAR SPINE W/O DYE: CPT

## 2020-02-03 ENCOUNTER — OFFICE VISIT (OUTPATIENT)
Dept: FAMILY MEDICINE | Facility: OTHER | Age: 50
End: 2020-02-03
Attending: FAMILY MEDICINE
Payer: COMMERCIAL

## 2020-02-03 ENCOUNTER — HOSPITAL ENCOUNTER (OUTPATIENT)
Dept: GENERAL RADIOLOGY | Facility: OTHER | Age: 50
Discharge: HOME | End: 2020-02-03
Attending: FAMILY MEDICINE | Admitting: FAMILY MEDICINE
Payer: COMMERCIAL

## 2020-02-03 VITALS
OXYGEN SATURATION: 98 % | RESPIRATION RATE: 16 BRPM | TEMPERATURE: 98.1 F | HEIGHT: 72 IN | BODY MASS INDEX: 28.31 KG/M2 | HEART RATE: 108 BPM | WEIGHT: 209 LBS

## 2020-02-03 DIAGNOSIS — M79.661 PAIN OF RIGHT LOWER LEG: ICD-10-CM

## 2020-02-03 DIAGNOSIS — M79.661 PAIN OF RIGHT LOWER LEG: Primary | ICD-10-CM

## 2020-02-03 PROCEDURE — 73590 X-RAY EXAM OF LOWER LEG: CPT | Mod: RT

## 2020-02-03 PROCEDURE — 99213 OFFICE O/P EST LOW 20 MIN: CPT | Performed by: FAMILY MEDICINE

## 2020-02-03 ASSESSMENT — MIFFLIN-ST. JEOR: SCORE: 1848.08

## 2020-02-03 ASSESSMENT — ENCOUNTER SYMPTOMS
FEVER: 0
CHILLS: 0

## 2020-02-03 ASSESSMENT — PAIN SCALES - GENERAL: PAINLEVEL: MILD PAIN (3)

## 2020-02-03 NOTE — NURSING NOTE
"Chief Complaint   Patient presents with     Leg Injury     Right calf area bruised and painful DOI- 1/24/20         Initial Pulse 108   Temp 98.1  F (36.7  C) (Temporal)   Resp 16   Ht 1.816 m (5' 11.5\")   Wt 94.8 kg (209 lb)   SpO2 98%   BMI 28.74 kg/m   Estimated body mass index is 28.74 kg/m  as calculated from the following:    Height as of this encounter: 1.816 m (5' 11.5\").    Weight as of this encounter: 94.8 kg (209 lb).    Medication Reconciliation: complete      Norma J. Gosselin, LPN  "
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
## 2020-02-10 ENCOUNTER — TELEPHONE (OUTPATIENT)
Dept: FAMILY MEDICINE | Facility: OTHER | Age: 50
End: 2020-02-10

## 2020-02-10 NOTE — TELEPHONE ENCOUNTER
After verifying patient's name and date of birth, patient was given the below information.  Norma J. Gosselin, LPN....2/10/2020 10:36 AM

## 2020-03-11 ENCOUNTER — HEALTH MAINTENANCE LETTER (OUTPATIENT)
Age: 50
End: 2020-03-11

## 2020-06-25 ENCOUNTER — HOSPITAL ENCOUNTER (OUTPATIENT)
Dept: GENERAL RADIOLOGY | Facility: OTHER | Age: 50
End: 2020-06-25
Attending: PODIATRIST
Payer: COMMERCIAL

## 2020-06-25 ENCOUNTER — OFFICE VISIT (OUTPATIENT)
Dept: ORTHOPEDICS | Facility: OTHER | Age: 50
End: 2020-06-25
Attending: PODIATRIST
Payer: COMMERCIAL

## 2020-06-25 VITALS
SYSTOLIC BLOOD PRESSURE: 130 MMHG | DIASTOLIC BLOOD PRESSURE: 82 MMHG | WEIGHT: 205 LBS | BODY MASS INDEX: 28.19 KG/M2 | HEART RATE: 88 BPM

## 2020-06-25 DIAGNOSIS — M25.571 RIGHT ANKLE PAIN, UNSPECIFIED CHRONICITY: Primary | ICD-10-CM

## 2020-06-25 DIAGNOSIS — M25.571 RIGHT ANKLE PAIN, UNSPECIFIED CHRONICITY: ICD-10-CM

## 2020-06-25 PROCEDURE — 99202 OFFICE O/P NEW SF 15 MIN: CPT | Performed by: PODIATRIST

## 2020-06-25 PROCEDURE — 73610 X-RAY EXAM OF ANKLE: CPT | Mod: RT
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
## 2020-06-25 NOTE — PROGRESS NOTES
Patient is here for consult on his right ankle pain.   Maritza Gooden LPN .....................6/25/2020 8:49 AM

## 2020-06-29 ENCOUNTER — HOSPITAL ENCOUNTER (OUTPATIENT)
Dept: MRI IMAGING | Facility: OTHER | Age: 50
Discharge: HOME | End: 2020-06-29
Attending: PODIATRIST | Admitting: PODIATRIST
Payer: COMMERCIAL

## 2020-06-29 DIAGNOSIS — M25.571 RIGHT ANKLE PAIN, UNSPECIFIED CHRONICITY: ICD-10-CM

## 2020-06-29 PROCEDURE — 73721 MRI JNT OF LWR EXTRE W/O DYE: CPT | Mod: RT

## 2020-07-19 ENCOUNTER — MYC MEDICAL ADVICE (OUTPATIENT)
Dept: FAMILY MEDICINE | Facility: OTHER | Age: 50
End: 2020-07-19

## 2020-08-12 ENCOUNTER — OFFICE VISIT (OUTPATIENT)
Dept: FAMILY MEDICINE | Facility: OTHER | Age: 50
End: 2020-08-12
Attending: FAMILY MEDICINE
Payer: COMMERCIAL

## 2020-08-12 VITALS
OXYGEN SATURATION: 97 % | SYSTOLIC BLOOD PRESSURE: 132 MMHG | RESPIRATION RATE: 18 BRPM | DIASTOLIC BLOOD PRESSURE: 86 MMHG | HEIGHT: 71 IN | TEMPERATURE: 97.6 F | WEIGHT: 206.8 LBS | HEART RATE: 94 BPM | BODY MASS INDEX: 28.95 KG/M2

## 2020-08-12 DIAGNOSIS — N52.9 ERECTILE DYSFUNCTION, UNSPECIFIED ERECTILE DYSFUNCTION TYPE: ICD-10-CM

## 2020-08-12 DIAGNOSIS — Z83.3 FAMILY HISTORY OF DIABETES MELLITUS: ICD-10-CM

## 2020-08-12 DIAGNOSIS — Z01.818 PRE-OP EXAM: Primary | ICD-10-CM

## 2020-08-12 DIAGNOSIS — Z01.818 PREOP TESTING: ICD-10-CM

## 2020-08-12 DIAGNOSIS — Z13.220 LIPID SCREENING: ICD-10-CM

## 2020-08-12 LAB
ALBUMIN SERPL-MCNC: 4.6 G/DL (ref 3.5–5.7)
ALP SERPL-CCNC: 42 U/L (ref 34–104)
ALT SERPL W P-5'-P-CCNC: 39 U/L (ref 7–52)
ANION GAP SERPL CALCULATED.3IONS-SCNC: 8 MMOL/L (ref 3–14)
AST SERPL W P-5'-P-CCNC: 25 U/L (ref 13–39)
BILIRUB SERPL-MCNC: 1.4 MG/DL (ref 0.3–1)
BUN SERPL-MCNC: 13 MG/DL (ref 7–25)
CALCIUM SERPL-MCNC: 9.4 MG/DL (ref 8.6–10.3)
CHLORIDE SERPL-SCNC: 101 MMOL/L (ref 98–107)
CHOLEST SERPL-MCNC: 215 MG/DL
CO2 SERPL-SCNC: 29 MMOL/L (ref 21–31)
CREAT SERPL-MCNC: 0.96 MG/DL (ref 0.7–1.3)
ERYTHROCYTE [DISTWIDTH] IN BLOOD BY AUTOMATED COUNT: 12.5 % (ref 10–15)
GFR SERPL CREATININE-BSD FRML MDRD: 83 ML/MIN/{1.73_M2}
GLUCOSE SERPL-MCNC: 107 MG/DL (ref 70–105)
HBA1C MFR BLD: 5.4 % (ref 4–6)
HCT VFR BLD AUTO: 45.2 % (ref 40–53)
HDLC SERPL-MCNC: 56 MG/DL (ref 23–92)
HGB BLD-MCNC: 15.9 G/DL (ref 13.3–17.7)
LDLC SERPL CALC-MCNC: 122 MG/DL
MCH RBC QN AUTO: 30.5 PG (ref 26.5–33)
MCHC RBC AUTO-ENTMCNC: 35.2 G/DL (ref 31.5–36.5)
MCV RBC AUTO: 87 FL (ref 78–100)
NONHDLC SERPL-MCNC: 159 MG/DL
PLATELET # BLD AUTO: 208 10E9/L (ref 150–450)
POTASSIUM SERPL-SCNC: 4.1 MMOL/L (ref 3.5–5.1)
PROT SERPL-MCNC: 7.6 G/DL (ref 6.4–8.9)
RBC # BLD AUTO: 5.22 10E12/L (ref 4.4–5.9)
SODIUM SERPL-SCNC: 138 MMOL/L (ref 134–144)
TRIGL SERPL-MCNC: 185 MG/DL
TSH SERPL DL<=0.05 MIU/L-ACNC: 1.84 IU/ML (ref 0.34–5.6)
WBC # BLD AUTO: 9.2 10E9/L (ref 4–11)

## 2020-08-12 PROCEDURE — 84443 ASSAY THYROID STIM HORMONE: CPT | Mod: ZL | Performed by: FAMILY MEDICINE

## 2020-08-12 PROCEDURE — 93000 ELECTROCARDIOGRAM COMPLETE: CPT | Performed by: INTERNAL MEDICINE

## 2020-08-12 PROCEDURE — 36415 COLL VENOUS BLD VENIPUNCTURE: CPT | Mod: ZL | Performed by: FAMILY MEDICINE

## 2020-08-12 PROCEDURE — 83036 HEMOGLOBIN GLYCOSYLATED A1C: CPT | Mod: ZL | Performed by: FAMILY MEDICINE

## 2020-08-12 PROCEDURE — 80053 COMPREHEN METABOLIC PANEL: CPT | Mod: ZL | Performed by: FAMILY MEDICINE

## 2020-08-12 PROCEDURE — 99214 OFFICE O/P EST MOD 30 MIN: CPT | Performed by: FAMILY MEDICINE

## 2020-08-12 PROCEDURE — 80061 LIPID PANEL: CPT | Mod: ZL | Performed by: FAMILY MEDICINE

## 2020-08-12 PROCEDURE — 85027 COMPLETE CBC AUTOMATED: CPT | Mod: ZL | Performed by: FAMILY MEDICINE

## 2020-08-12 RX ORDER — SILDENAFIL 25 MG/1
TABLET, FILM COATED ORAL
Qty: 10 TABLET | Refills: 4 | Status: SHIPPED | OUTPATIENT
Start: 2020-08-12 | End: 2021-05-24

## 2020-08-12 ASSESSMENT — PAIN SCALES - GENERAL: PAINLEVEL: NO PAIN (0)

## 2020-08-12 ASSESSMENT — MIFFLIN-ST. JEOR: SCORE: 1825.17

## 2020-08-12 NOTE — H&P (VIEW-ONLY)
[facility] AND [facility]  [address]  [place] [address]  [phone]  Dept: [phone]    PRE-OP EVALUATION:  Today's date: 2020    Moses Navarro (: 1970) presents for pre-operative evaluation assessment as requested by Dr. Black.  He requires evaluation and anesthesia risk assessment prior to undergoing surgery/procedure for treatment of  ARTHROSCOPY, ANKLE, WITH OPEN REPAIR OF ANKLE LIGAMENT.    Proposed Surgery/ Procedure: ARTHROSCOPY, ANKLE, WITH OPEN REPAIR OF ANKLE LIGAMENT  Date of Surgery/ Procedure: 9/3/2020  Time of Surgery/ Procedure: [facility]  Hospital/Surgical Facility: [facility]  Surgery Fax Number: NA  Primary Physician: Jose M De La Cruz  Type of Anesthesia Anticipated: General    Preoperative Questionnaire:   No - Have you ever had a heart attack or stroke?  No - Have you ever had surgery on your heart or blood vessels, such as a stent, coronary (heart) bypass, or surgery on an artery in the head, neck, heart, or legs?  No - Do you have chest pain when you are physically active?  No - Do you have a history of heart failure?  No - Do you currently have a cold, bronchitis, or symptoms of other respiratory (head and chest) infections?  No - Do you have a cough, shortness of breath, or wheezing?  No - Do you or anyone in your family have a history of blood clots?  No - Do you or anyone in your family have a serious bleeding problem, such as long-lasting bleeding after surgeries or cuts?  No - Have you ever had anemia or been told to take iron pills?  No - Have you had any abnormal blood loss such as black, tarry or bloody stools, or abnormal vaginal bleeding?  No - Have you ever had a blood transfusion?  Yes - Are you willing to have a blood transfusion if it is medically needed before, during, or after your surgery?  No - Have you or anyone in your family ever had problems with anesthesia (sedation for surgery)?  No - Do you have sleep apnea,  excessive snoring, or daytime drowsiness?   No - Do you have any artifical heart valves or other implanted medical devices, such as a pacemaker, defibrillator, or continuous glucose monitor?  No - Do you have any artifical joints?  No - Are you allergic to latex?  No - Is there any chance that you may be pregnant?    Patient has a Health Care Directive or Living Will:  NO    HPI:     HPI related to upcoming procedure: 50 year old male with without chronic medications, but with FHx of diabetes here for a preop evaluation prior to ankle surgery.     He feels well. Tolerated previous surgeries without difficulty. No CP or SOB with exercise.    Has noticed ED for a few years. No good explanation for this problem given age and lack of medical problems. No claudication symptoms and no angina symptoms. Has libido, but unable to sustain an erection. Less nocturnal erections as well.      MEDICAL HISTORY:     Patient Active Problem List    Diagnosis Date Noted     Hematuria 02/03/2017     Priority: Medium     Other dyspnea and respiratory abnormality 05/07/2012     Priority: Medium     Insomnia, persistent 10/19/2011     Priority: Medium     Crush injury of hand 03/08/2011     Priority: Medium     Atrial flutter (H) 01/25/2011     Priority: Medium      Past Medical History:   Diagnosis Date     Atrial fibrillation and flutter     since 01/2011     Injury     crush injury of the right hand     Injury     hx of c-spine injury     Injury of tendon of rotator cuff     No Comments Provided     Other injury of spleen, initial encounter     No Comments Provided     Past Surgical History:   Procedure Laterality Date     ARTHROSCOPY KNEE      Right knee     OTHER SURGICAL HISTORY      05224.9,[place] SUBTALAR ATHROEREISIS     OTHER SURGICAL HISTORY      9/29/2011,915541,EP STUDY /ABLATION,Atrial Fibrillation     OTHER SURGICAL HISTORY      463543,OTHER,angiogram right hand     No current outpatient medications on file.     OTC products:  "None, except as noted above    No Known Allergies   Latex Allergy: NO    Social History     Tobacco Use     Smoking status: Never Smoker     Smokeless tobacco: Never Used   Substance Use Topics     Alcohol use: Yes     Alcohol/week: 6.0 standard drinks     History   Drug Use Unknown     Comment: Drug use: No       REVIEW OF SYSTEMS:   General: Denies general constitutional problems  Eyes: Denies problems  Ears/Nose/Throat: Denies problems  Cardiovascular: Denies problems  Respiratory: Denies problems  Gastrointestinal: Denies problems  Musculoskeletal: Denies problems  Skin: Denies problems  Neurologic: Denies problems  Psychiatric: Denies problems      EXAM:   /86   Pulse 94   Temp 97.6  F (36.4  C) (Temporal)   Resp 18   Ht 1.803 m (5' 11\")   Wt 93.8 kg (206 lb 12.8 oz)   SpO2 97%   BMI 28.84 kg/m    General Appearance: Pleasant, alert, appropriate appearance for age. No acute distress  Ear Exam: Normal TM's bilaterally.   OroPharynx Exam: Dental hygiene adequate. Normal buccal mucosa. Normal pharynx. Mallampati 2/4.  Neck Exam: Supple, no masses or nodes.  Chest/Respiratory Exam: Normal chest wall and respirations. Clear to auscultation.  Cardiovascular Exam: Regular rate and rhythm. S1, S2, no murmur, click, gallop, or rubs.  Extremities: 2 + pedal pulses.  No lower extremity edema.  Neurologic Exam: Nonfocal; symmetric DTRs, normal gross motor movement, tone, and coordination. No tremor.   Psychiatric: Normal affect and mentation      DIAGNOSTICS:     Results for orders placed or performed in visit on 08/12/20   Comprehensive Metabolic Panel     Status: Abnormal   Result Value Ref Range    Sodium 138 134 - 144 mmol/L    Potassium 4.1 3.5 - 5.1 mmol/L    Chloride 101 98 - 107 mmol/L    Carbon Dioxide 29 21 - 31 mmol/L    Anion Gap 8 3 - 14 mmol/L    Glucose 107 (H) 70 - 105 mg/dL    Urea Nitrogen 13 7 - 25 mg/dL    Creatinine 0.96 0.70 - 1.30 mg/dL    GFR Estimate 83 >60 mL/min/[1.73_m2]    GFR " Estimate If Black >90 >60 mL/min/[1.73_m2]    Calcium 9.4 8.6 - 10.3 mg/dL    Bilirubin Total 1.4 (H) 0.3 - 1.0 mg/dL    Albumin 4.6 3.5 - 5.7 g/dL    Protein Total 7.6 6.4 - 8.9 g/dL    Alkaline Phosphatase 42 34 - 104 U/L    ALT 39 7 - 52 U/L    AST 25 13 - 39 U/L   CBC W PLT No Diff     Status: None   Result Value Ref Range    WBC 9.2 4.0 - 11.0 10e9/L    RBC Count 5.22 4.4 - 5.9 10e12/L    Hemoglobin 15.9 13.3 - 17.7 g/dL    Hematocrit 45.2 40.0 - 53.0 %    MCV 87 78 - 100 fl    MCH 30.5 26.5 - 33.0 pg    MCHC 35.2 31.5 - 36.5 g/dL    RDW 12.5 10.0 - 15.0 %    Platelet Count 208 150 - 450 10e9/L   TSH Reflex GH     Status: None   Result Value Ref Range    TSH Reflex 1.84 0.34 - 5.60 IU/mL   Lipid Panel     Status: Abnormal   Result Value Ref Range    Cholesterol 215 (H) <200 mg/dL    Triglycerides 185 (H) <150 mg/dL    HDL Cholesterol 56 23 - 92 mg/dL    LDL Cholesterol Calculated 122 (H) <100 mg/dL    Non HDL Cholesterol 159 (H) <130 mg/dL   Hemoglobin A1c     Status: None   Result Value Ref Range    Hemoglobin A1C 5.4 4.0 - 6.0 %   EKG 12-lead, tracing only     Status: None   Result Value Ref Range    Interpretation ECG Click View Image link to view waveform and result         Recent Labs   Lab Test 02/03/17  1650      POTASSIUM 4.0   CR 0.92        IMPRESSION:       ICD-10-CM    1. Pre-op exam  Z01.818 EKG 12-lead, tracing only   2. Family history of diabetes mellitus  Z83.3 Hemoglobin A1c     Hemoglobin A1c   3. Lipid screening  Z13.220 Lipid Panel     Lipid Panel   4. Preop testing  Z01.818 TSH Reflex GH     CBC W PLT No Diff     Comprehensive Metabolic Panel     Comprehensive Metabolic Panel     CBC W PLT No Diff     TSH Reflex GH   5. Erectile dysfunction, unspecified erectile dysfunction type  N52.9 sildenafil (VIAGRA) 25 MG tablet         The proposed surgical procedure is considered INTERMEDIATE risk.    REVISED CARDIAC RISK INDEX  The patient has the following serious cardiovascular risks for  perioperative complications such as (MI, PE, VFib and 3  AV Block):  No serious cardiac risks  INTERPRETATION: 0 risks: Class I (very low risk - 0.4% complication rate)    The patient has the following additional risks for perioperative complications:  No identified additional risks        RECOMMENDATIONS:       Cardiovascular Risk  Performs over 4 METs exercise without symptoms () .       --Patient is on no chronic medications    APPROVAL GIVEN to proceed with proposed procedure, without further diagnostic evaluation     He had ED without clear etiology. Usually not an issue until older. Obtained TSH, A1c, CBC, CMP to evaluate for underlying issues. Labs normal. Agree to provide prescription for low dose sildenafil to see if this helps. If not, consider testosterone level.    Signed Electronically by: Jose M De La Cruz MD    Copy of this evaluation report is provided to requesting physician.    [facility] Preop Guidelines    Revised Cardiac Risk Index

## 2020-08-12 NOTE — NURSING NOTE
Pt presents to clinic today for pre-op exam.      Medication Reconciliation: complete  Ana Peres LPN

## 2020-08-12 NOTE — PROGRESS NOTES
[facility] AND [facility]  [address]  [place] [address]  [phone]  Dept: [phone]    PRE-OP EVALUATION:  Today's date: 2020    Moses Navarro (: 1970) presents for pre-operative evaluation assessment as requested by Dr. Black.  He requires evaluation and anesthesia risk assessment prior to undergoing surgery/procedure for treatment of  ARTHROSCOPY, ANKLE, WITH OPEN REPAIR OF ANKLE LIGAMENT.    Proposed Surgery/ Procedure: ARTHROSCOPY, ANKLE, WITH OPEN REPAIR OF ANKLE LIGAMENT  Date of Surgery/ Procedure: 9/3/2020  Time of Surgery/ Procedure: [facility]  Hospital/Surgical Facility: [facility]  Surgery Fax Number: NA  Primary Physician: Jose M De La Cruz  Type of Anesthesia Anticipated: General    Preoperative Questionnaire:   No - Have you ever had a heart attack or stroke?  No - Have you ever had surgery on your heart or blood vessels, such as a stent, coronary (heart) bypass, or surgery on an artery in the head, neck, heart, or legs?  No - Do you have chest pain when you are physically active?  No - Do you have a history of heart failure?  No - Do you currently have a cold, bronchitis, or symptoms of other respiratory (head and chest) infections?  No - Do you have a cough, shortness of breath, or wheezing?  No - Do you or anyone in your family have a history of blood clots?  No - Do you or anyone in your family have a serious bleeding problem, such as long-lasting bleeding after surgeries or cuts?  No - Have you ever had anemia or been told to take iron pills?  No - Have you had any abnormal blood loss such as black, tarry or bloody stools, or abnormal vaginal bleeding?  No - Have you ever had a blood transfusion?  Yes - Are you willing to have a blood transfusion if it is medically needed before, during, or after your surgery?  No - Have you or anyone in your family ever had problems with anesthesia (sedation for surgery)?  No - Do you have sleep apnea,  excessive snoring, or daytime drowsiness?   No - Do you have any artifical heart valves or other implanted medical devices, such as a pacemaker, defibrillator, or continuous glucose monitor?  No - Do you have any artifical joints?  No - Are you allergic to latex?  No - Is there any chance that you may be pregnant?    Patient has a Health Care Directive or Living Will:  NO    HPI:     HPI related to upcoming procedure: 50 year old male with without chronic medications, but with FHx of diabetes here for a preop evaluation prior to ankle surgery.     He feels well. Tolerated previous surgeries without difficulty. No CP or SOB with exercise.    Has noticed ED for a few years. No good explanation for this problem given age and lack of medical problems. No claudication symptoms and no angina symptoms. Has libido, but unable to sustain an erection. Less nocturnal erections as well.      MEDICAL HISTORY:     Patient Active Problem List    Diagnosis Date Noted     Hematuria 02/03/2017     Priority: Medium     Other dyspnea and respiratory abnormality 05/07/2012     Priority: Medium     Insomnia, persistent 10/19/2011     Priority: Medium     Crush injury of hand 03/08/2011     Priority: Medium     Atrial flutter (H) 01/25/2011     Priority: Medium      Past Medical History:   Diagnosis Date     Atrial fibrillation and flutter     since 01/2011     Injury     crush injury of the right hand     Injury     hx of c-spine injury     Injury of tendon of rotator cuff     No Comments Provided     Other injury of spleen, initial encounter     No Comments Provided     Past Surgical History:   Procedure Laterality Date     ARTHROSCOPY KNEE      Right knee     OTHER SURGICAL HISTORY      67810.9,[place] SUBTALAR ATHROEREISIS     OTHER SURGICAL HISTORY      9/29/2011,331458,EP STUDY /ABLATION,Atrial Fibrillation     OTHER SURGICAL HISTORY      092364,OTHER,angiogram right hand     No current outpatient medications on file.     OTC products:  "None, except as noted above    No Known Allergies   Latex Allergy: NO    Social History     Tobacco Use     Smoking status: Never Smoker     Smokeless tobacco: Never Used   Substance Use Topics     Alcohol use: Yes     Alcohol/week: 6.0 standard drinks     History   Drug Use Unknown     Comment: Drug use: No       REVIEW OF SYSTEMS:   General: Denies general constitutional problems  Eyes: Denies problems  Ears/Nose/Throat: Denies problems  Cardiovascular: Denies problems  Respiratory: Denies problems  Gastrointestinal: Denies problems  Musculoskeletal: Denies problems  Skin: Denies problems  Neurologic: Denies problems  Psychiatric: Denies problems      EXAM:   /86   Pulse 94   Temp 97.6  F (36.4  C) (Temporal)   Resp 18   Ht 1.803 m (5' 11\")   Wt 93.8 kg (206 lb 12.8 oz)   SpO2 97%   BMI 28.84 kg/m    General Appearance: Pleasant, alert, appropriate appearance for age. No acute distress  Ear Exam: Normal TM's bilaterally.   OroPharynx Exam: Dental hygiene adequate. Normal buccal mucosa. Normal pharynx. Mallampati 2/4.  Neck Exam: Supple, no masses or nodes.  Chest/Respiratory Exam: Normal chest wall and respirations. Clear to auscultation.  Cardiovascular Exam: Regular rate and rhythm. S1, S2, no murmur, click, gallop, or rubs.  Extremities: 2 + pedal pulses.  No lower extremity edema.  Neurologic Exam: Nonfocal; symmetric DTRs, normal gross motor movement, tone, and coordination. No tremor.   Psychiatric: Normal affect and mentation      DIAGNOSTICS:     Results for orders placed or performed in visit on 08/12/20   Comprehensive Metabolic Panel     Status: Abnormal   Result Value Ref Range    Sodium 138 134 - 144 mmol/L    Potassium 4.1 3.5 - 5.1 mmol/L    Chloride 101 98 - 107 mmol/L    Carbon Dioxide 29 21 - 31 mmol/L    Anion Gap 8 3 - 14 mmol/L    Glucose 107 (H) 70 - 105 mg/dL    Urea Nitrogen 13 7 - 25 mg/dL    Creatinine 0.96 0.70 - 1.30 mg/dL    GFR Estimate 83 >60 mL/min/[1.73_m2]    GFR " Estimate If Black >90 >60 mL/min/[1.73_m2]    Calcium 9.4 8.6 - 10.3 mg/dL    Bilirubin Total 1.4 (H) 0.3 - 1.0 mg/dL    Albumin 4.6 3.5 - 5.7 g/dL    Protein Total 7.6 6.4 - 8.9 g/dL    Alkaline Phosphatase 42 34 - 104 U/L    ALT 39 7 - 52 U/L    AST 25 13 - 39 U/L   CBC W PLT No Diff     Status: None   Result Value Ref Range    WBC 9.2 4.0 - 11.0 10e9/L    RBC Count 5.22 4.4 - 5.9 10e12/L    Hemoglobin 15.9 13.3 - 17.7 g/dL    Hematocrit 45.2 40.0 - 53.0 %    MCV 87 78 - 100 fl    MCH 30.5 26.5 - 33.0 pg    MCHC 35.2 31.5 - 36.5 g/dL    RDW 12.5 10.0 - 15.0 %    Platelet Count 208 150 - 450 10e9/L   TSH Reflex GH     Status: None   Result Value Ref Range    TSH Reflex 1.84 0.34 - 5.60 IU/mL   Lipid Panel     Status: Abnormal   Result Value Ref Range    Cholesterol 215 (H) <200 mg/dL    Triglycerides 185 (H) <150 mg/dL    HDL Cholesterol 56 23 - 92 mg/dL    LDL Cholesterol Calculated 122 (H) <100 mg/dL    Non HDL Cholesterol 159 (H) <130 mg/dL   Hemoglobin A1c     Status: None   Result Value Ref Range    Hemoglobin A1C 5.4 4.0 - 6.0 %   EKG 12-lead, tracing only     Status: None   Result Value Ref Range    Interpretation ECG Click View Image link to view waveform and result         Recent Labs   Lab Test 02/03/17  1650      POTASSIUM 4.0   CR 0.92        IMPRESSION:       ICD-10-CM    1. Pre-op exam  Z01.818 EKG 12-lead, tracing only   2. Family history of diabetes mellitus  Z83.3 Hemoglobin A1c     Hemoglobin A1c   3. Lipid screening  Z13.220 Lipid Panel     Lipid Panel   4. Preop testing  Z01.818 TSH Reflex GH     CBC W PLT No Diff     Comprehensive Metabolic Panel     Comprehensive Metabolic Panel     CBC W PLT No Diff     TSH Reflex GH   5. Erectile dysfunction, unspecified erectile dysfunction type  N52.9 sildenafil (VIAGRA) 25 MG tablet         The proposed surgical procedure is considered INTERMEDIATE risk.    REVISED CARDIAC RISK INDEX  The patient has the following serious cardiovascular risks for  perioperative complications such as (MI, PE, VFib and 3  AV Block):  No serious cardiac risks  INTERPRETATION: 0 risks: Class I (very low risk - 0.4% complication rate)    The patient has the following additional risks for perioperative complications:  No identified additional risks        RECOMMENDATIONS:       Cardiovascular Risk  Performs over 4 METs exercise without symptoms () .       --Patient is on no chronic medications    APPROVAL GIVEN to proceed with proposed procedure, without further diagnostic evaluation     He had ED without clear etiology. Usually not an issue until older. Obtained TSH, A1c, CBC, CMP to evaluate for underlying issues. Labs normal. Agree to provide prescription for low dose sildenafil to see if this helps. If not, consider testosterone level.    Signed Electronically by: Jose M De La Cruz MD    Copy of this evaluation report is provided to requesting physician.    [facility] Preop Guidelines    Revised Cardiac Risk Index

## 2020-08-14 LAB — INTERPRETATION ECG - MUSE: NORMAL

## 2020-08-31 ENCOUNTER — ALLIED HEALTH/NURSE VISIT (OUTPATIENT)
Dept: FAMILY MEDICINE | Facility: OTHER | Age: 50
End: 2020-08-31
Attending: FAMILY MEDICINE
Payer: COMMERCIAL

## 2020-08-31 DIAGNOSIS — Z20.822 ENCOUNTER FOR LABORATORY TESTING FOR COVID-19 VIRUS: Primary | ICD-10-CM

## 2020-08-31 PROCEDURE — 99207 ZZC NO CHARGE NURSE ONLY: CPT

## 2020-08-31 PROCEDURE — U0003 INFECTIOUS AGENT DETECTION BY NUCLEIC ACID (DNA OR RNA); SEVERE ACUTE RESPIRATORY SYNDROME CORONAVIRUS 2 (SARS-COV-2) (CORONAVIRUS DISEASE [COVID-19]), AMPLIFIED PROBE TECHNIQUE, MAKING USE OF HIGH THROUGHPUT TECHNOLOGIES AS DESCRIBED BY CMS-2020-01-R: HCPCS | Mod: ZL | Performed by: FAMILY MEDICINE

## 2020-08-31 PROCEDURE — C9803 HOPD COVID-19 SPEC COLLECT: HCPCS

## 2020-09-02 ENCOUNTER — ANESTHESIA EVENT (OUTPATIENT)
Dept: SURGERY | Facility: OTHER | Age: 50
End: 2020-09-02
Payer: COMMERCIAL

## 2020-09-03 ENCOUNTER — HOSPITAL ENCOUNTER (OUTPATIENT)
Facility: OTHER | Age: 50
Discharge: HOME | End: 2020-09-03
Attending: PODIATRIST | Admitting: PODIATRIST
Payer: COMMERCIAL

## 2020-09-03 ENCOUNTER — ANESTHESIA (OUTPATIENT)
Dept: SURGERY | Facility: OTHER | Age: 50
End: 2020-09-03
Payer: COMMERCIAL

## 2020-09-03 VITALS
TEMPERATURE: 97.4 F | BODY MASS INDEX: 28.84 KG/M2 | RESPIRATION RATE: 14 BRPM | HEART RATE: 83 BPM | OXYGEN SATURATION: 97 % | SYSTOLIC BLOOD PRESSURE: 140 MMHG | WEIGHT: 206 LBS | HEIGHT: 71 IN | DIASTOLIC BLOOD PRESSURE: 88 MMHG

## 2020-09-03 DIAGNOSIS — G89.18 POST-OP PAIN: Primary | ICD-10-CM

## 2020-09-03 LAB
SARS-COV-2 RNA SPEC QL NAA+PROBE: NOT DETECTED
SPECIMEN SOURCE: NORMAL

## 2020-09-03 PROCEDURE — 25000128 H RX IP 250 OP 636: Performed by: NURSE ANESTHETIST, CERTIFIED REGISTERED

## 2020-09-03 PROCEDURE — 25000128 H RX IP 250 OP 636: Performed by: PODIATRIST

## 2020-09-03 PROCEDURE — 25800025 ZZH RX 258: Performed by: PODIATRIST

## 2020-09-03 PROCEDURE — 71000027 ZZH RECOVERY PHASE 2 EACH 15 MINS: Performed by: PODIATRIST

## 2020-09-03 PROCEDURE — 25000125 ZZHC RX 250: Performed by: PODIATRIST

## 2020-09-03 PROCEDURE — 37000009 ZZH ANESTHESIA TECHNICAL FEE, EACH ADDTL 15 MIN: Performed by: PODIATRIST

## 2020-09-03 PROCEDURE — 36000063 ZZH SURGERY LEVEL 4 EA 15 ADDTL MIN: Performed by: PODIATRIST

## 2020-09-03 PROCEDURE — 36000093 ZZH SURGERY LEVEL 4 1ST 30 MIN: Performed by: PODIATRIST

## 2020-09-03 PROCEDURE — 40000306 ZZH STATISTIC PRE PROC ASSESS II: Performed by: PODIATRIST

## 2020-09-03 PROCEDURE — 25000125 ZZHC RX 250: Performed by: NURSE ANESTHETIST, CERTIFIED REGISTERED

## 2020-09-03 PROCEDURE — 27810325 ZZHC OR IMPLANT OTHER OPNP: Performed by: PODIATRIST

## 2020-09-03 PROCEDURE — 29898 ANKLE ARTHROSCOPY/SURGERY: CPT | Performed by: PODIATRIST

## 2020-09-03 PROCEDURE — 25800030 ZZH RX IP 258 OP 636: Performed by: NURSE ANESTHETIST, CERTIFIED REGISTERED

## 2020-09-03 PROCEDURE — 27696 REPAIR OF ANKLE LIGAMENTS: CPT | Performed by: NURSE ANESTHETIST, CERTIFIED REGISTERED

## 2020-09-03 PROCEDURE — 76942 ECHO GUIDE FOR BIOPSY: CPT | Mod: 26 | Performed by: NURSE ANESTHETIST, CERTIFIED REGISTERED

## 2020-09-03 PROCEDURE — 64445 NJX AA&/STRD SCIATIC NRV IMG: CPT | Mod: RT | Performed by: NURSE ANESTHETIST, CERTIFIED REGISTERED

## 2020-09-03 PROCEDURE — 71000014 ZZH RECOVERY PHASE 1 LEVEL 2 FIRST HR: Performed by: PODIATRIST

## 2020-09-03 PROCEDURE — 37000008 ZZH ANESTHESIA TECHNICAL FEE, 1ST 30 MIN: Performed by: PODIATRIST

## 2020-09-03 PROCEDURE — 27696 REPAIR OF ANKLE LIGAMENTS: CPT | Performed by: PODIATRIST

## 2020-09-03 PROCEDURE — 27210794 ZZH OR GENERAL SUPPLY STERILE: Performed by: PODIATRIST

## 2020-09-03 PROCEDURE — C1713 ANCHOR/SCREW BN/BN,TIS/BN: HCPCS | Performed by: PODIATRIST

## 2020-09-03 DEVICE — IMP KIT REPAIR LIGAMENT AUGMENTATION INT BRACE AR-1688-CP: Type: IMPLANTABLE DEVICE | Site: ANKLE | Status: FUNCTIONAL

## 2020-09-03 RX ORDER — KETOROLAC TROMETHAMINE 30 MG/ML
INJECTION, SOLUTION INTRAMUSCULAR; INTRAVENOUS PRN
Status: DISCONTINUED | OUTPATIENT
Start: 2020-09-03 | End: 2020-09-03

## 2020-09-03 RX ORDER — ONDANSETRON 2 MG/ML
4 INJECTION INTRAMUSCULAR; INTRAVENOUS EVERY 30 MIN PRN
Status: DISCONTINUED | OUTPATIENT
Start: 2020-09-03 | End: 2020-09-03 | Stop reason: HOSPADM

## 2020-09-03 RX ORDER — ONDANSETRON 4 MG/1
4 TABLET, ORALLY DISINTEGRATING ORAL
Status: DISCONTINUED | OUTPATIENT
Start: 2020-09-03 | End: 2020-09-03 | Stop reason: HOSPADM

## 2020-09-03 RX ORDER — ACETAMINOPHEN 325 MG/1
650 TABLET ORAL
Status: DISCONTINUED | OUTPATIENT
Start: 2020-09-03 | End: 2020-09-03 | Stop reason: HOSPADM

## 2020-09-03 RX ORDER — ONDANSETRON 4 MG/1
4 TABLET, ORALLY DISINTEGRATING ORAL EVERY 30 MIN PRN
Status: DISCONTINUED | OUTPATIENT
Start: 2020-09-03 | End: 2020-09-03 | Stop reason: HOSPADM

## 2020-09-03 RX ORDER — SODIUM CHLORIDE, SODIUM LACTATE, POTASSIUM CHLORIDE, CALCIUM CHLORIDE 600; 310; 30; 20 MG/100ML; MG/100ML; MG/100ML; MG/100ML
INJECTION, SOLUTION INTRAVENOUS CONTINUOUS
Status: DISCONTINUED | OUTPATIENT
Start: 2020-09-03 | End: 2020-09-03 | Stop reason: HOSPADM

## 2020-09-03 RX ORDER — IBUPROFEN 600 MG/1
600 TABLET, FILM COATED ORAL EVERY 6 HOURS
Qty: 12 TABLET | Refills: 0 | Status: SHIPPED | OUTPATIENT
Start: 2020-09-03 | End: 2020-09-06

## 2020-09-03 RX ORDER — MEPERIDINE HYDROCHLORIDE 50 MG/ML
12.5 INJECTION INTRAMUSCULAR; INTRAVENOUS; SUBCUTANEOUS
Status: DISCONTINUED | OUTPATIENT
Start: 2020-09-03 | End: 2020-09-03 | Stop reason: HOSPADM

## 2020-09-03 RX ORDER — NALOXONE HYDROCHLORIDE 0.4 MG/ML
.1-.4 INJECTION, SOLUTION INTRAMUSCULAR; INTRAVENOUS; SUBCUTANEOUS
Status: DISCONTINUED | OUTPATIENT
Start: 2020-09-03 | End: 2020-09-03 | Stop reason: HOSPADM

## 2020-09-03 RX ORDER — DEXAMETHASONE SODIUM PHOSPHATE 10 MG/ML
INJECTION, SOLUTION INTRAMUSCULAR; INTRAVENOUS PRN
Status: DISCONTINUED | OUTPATIENT
Start: 2020-09-03 | End: 2020-09-03

## 2020-09-03 RX ORDER — BUPIVACAINE HYDROCHLORIDE 5 MG/ML
INJECTION, SOLUTION EPIDURAL; INTRACAUDAL PRN
Status: DISCONTINUED | OUTPATIENT
Start: 2020-09-03 | End: 2020-09-03

## 2020-09-03 RX ORDER — CEFAZOLIN SODIUM 2 G/100ML
2 INJECTION, SOLUTION INTRAVENOUS
Status: COMPLETED | OUTPATIENT
Start: 2020-09-03 | End: 2020-09-03

## 2020-09-03 RX ORDER — ACETAMINOPHEN 500 MG
1000 TABLET ORAL EVERY 8 HOURS
Qty: 30 TABLET | Refills: 0 | Status: SHIPPED | OUTPATIENT
Start: 2020-09-03 | End: 2020-09-08

## 2020-09-03 RX ORDER — OXYCODONE HYDROCHLORIDE 5 MG/1
5 TABLET ORAL
Status: DISCONTINUED | OUTPATIENT
Start: 2020-09-03 | End: 2020-09-03 | Stop reason: HOSPADM

## 2020-09-03 RX ORDER — CEFAZOLIN SODIUM 1 G/50ML
1 INJECTION, SOLUTION INTRAVENOUS SEE ADMIN INSTRUCTIONS
Status: DISCONTINUED | OUTPATIENT
Start: 2020-09-03 | End: 2020-09-03 | Stop reason: HOSPADM

## 2020-09-03 RX ORDER — FENTANYL CITRATE 50 UG/ML
25-50 INJECTION, SOLUTION INTRAMUSCULAR; INTRAVENOUS
Status: DISCONTINUED | OUTPATIENT
Start: 2020-09-03 | End: 2020-09-03 | Stop reason: HOSPADM

## 2020-09-03 RX ORDER — DEXAMETHASONE SODIUM PHOSPHATE 4 MG/ML
INJECTION, SOLUTION INTRA-ARTICULAR; INTRALESIONAL; INTRAMUSCULAR; INTRAVENOUS; SOFT TISSUE PRN
Status: DISCONTINUED | OUTPATIENT
Start: 2020-09-03 | End: 2020-09-03

## 2020-09-03 RX ORDER — PROPOFOL 10 MG/ML
INJECTION, EMULSION INTRAVENOUS PRN
Status: DISCONTINUED | OUTPATIENT
Start: 2020-09-03 | End: 2020-09-03

## 2020-09-03 RX ORDER — LIDOCAINE 40 MG/G
CREAM TOPICAL
Status: DISCONTINUED | OUTPATIENT
Start: 2020-09-03 | End: 2020-09-03 | Stop reason: HOSPADM

## 2020-09-03 RX ORDER — BUPIVACAINE HYDROCHLORIDE 5 MG/ML
INJECTION, SOLUTION PERINEURAL PRN
Status: DISCONTINUED | OUTPATIENT
Start: 2020-09-03 | End: 2020-09-03 | Stop reason: HOSPADM

## 2020-09-03 RX ORDER — FLUMAZENIL 0.1 MG/ML
0.2 INJECTION, SOLUTION INTRAVENOUS
Status: DISCONTINUED | OUTPATIENT
Start: 2020-09-03 | End: 2020-09-03 | Stop reason: HOSPADM

## 2020-09-03 RX ORDER — ONDANSETRON 2 MG/ML
INJECTION INTRAMUSCULAR; INTRAVENOUS PRN
Status: DISCONTINUED | OUTPATIENT
Start: 2020-09-03 | End: 2020-09-03

## 2020-09-03 RX ORDER — FENTANYL CITRATE 50 UG/ML
INJECTION, SOLUTION INTRAMUSCULAR; INTRAVENOUS PRN
Status: DISCONTINUED | OUTPATIENT
Start: 2020-09-03 | End: 2020-09-03

## 2020-09-03 RX ORDER — OXYCODONE HYDROCHLORIDE 5 MG/1
5 TABLET ORAL EVERY 6 HOURS PRN
Qty: 20 TABLET | Refills: 0 | Status: SHIPPED | OUTPATIENT
Start: 2020-09-03 | End: 2021-05-24

## 2020-09-03 RX ORDER — LIDOCAINE HYDROCHLORIDE 20 MG/ML
INJECTION, SOLUTION INFILTRATION; PERINEURAL PRN
Status: DISCONTINUED | OUTPATIENT
Start: 2020-09-03 | End: 2020-09-03

## 2020-09-03 RX ORDER — KETAMINE HYDROCHLORIDE 10 MG/ML
INJECTION INTRAMUSCULAR; INTRAVENOUS PRN
Status: DISCONTINUED | OUTPATIENT
Start: 2020-09-03 | End: 2020-09-03

## 2020-09-03 RX ORDER — HYDROMORPHONE HYDROCHLORIDE 1 MG/ML
.3-.5 INJECTION, SOLUTION INTRAMUSCULAR; INTRAVENOUS; SUBCUTANEOUS EVERY 10 MIN PRN
Status: DISCONTINUED | OUTPATIENT
Start: 2020-09-03 | End: 2020-09-03 | Stop reason: HOSPADM

## 2020-09-03 RX ORDER — PROPOFOL 10 MG/ML
INJECTION, EMULSION INTRAVENOUS CONTINUOUS PRN
Status: DISCONTINUED | OUTPATIENT
Start: 2020-09-03 | End: 2020-09-03

## 2020-09-03 RX ORDER — ACETAMINOPHEN 10 MG/ML
INJECTION, SOLUTION INTRAVENOUS PRN
Status: DISCONTINUED | OUTPATIENT
Start: 2020-09-03 | End: 2020-09-03

## 2020-09-03 RX ORDER — HYDROXYZINE HYDROCHLORIDE 10 MG/1
20 TABLET, FILM COATED ORAL
Status: DISCONTINUED | OUTPATIENT
Start: 2020-09-03 | End: 2020-09-03 | Stop reason: HOSPADM

## 2020-09-03 RX ADMIN — DEXAMETHASONE SODIUM PHOSPHATE 2 MG: 10 INJECTION, SOLUTION INTRAMUSCULAR; INTRAVENOUS at 07:39

## 2020-09-03 RX ADMIN — PROPOFOL 200 MCG/KG/MIN: 10 INJECTION, EMULSION INTRAVENOUS at 07:57

## 2020-09-03 RX ADMIN — PROPOFOL 200 MG: 10 INJECTION, EMULSION INTRAVENOUS at 07:57

## 2020-09-03 RX ADMIN — Medication 30 MG: at 08:30

## 2020-09-03 RX ADMIN — FENTANYL CITRATE 25 MCG: 50 INJECTION, SOLUTION INTRAMUSCULAR; INTRAVENOUS at 08:32

## 2020-09-03 RX ADMIN — CEFAZOLIN SODIUM 2 G: 2 INJECTION, SOLUTION INTRAVENOUS at 08:05

## 2020-09-03 RX ADMIN — ONDANSETRON 4 MG: 2 INJECTION INTRAMUSCULAR; INTRAVENOUS at 07:57

## 2020-09-03 RX ADMIN — ACETAMINOPHEN 1000 MG: 10 INJECTION, SOLUTION INTRAVENOUS at 08:37

## 2020-09-03 RX ADMIN — LIDOCAINE HYDROCHLORIDE 0.1 ML: 10 INJECTION, SOLUTION EPIDURAL; INFILTRATION; INTRACAUDAL; PERINEURAL at 06:50

## 2020-09-03 RX ADMIN — BUPIVACAINE HYDROCHLORIDE 20 ML: 5 INJECTION, SOLUTION EPIDURAL; INTRACAUDAL; PERINEURAL at 07:31

## 2020-09-03 RX ADMIN — DEXAMETHASONE SODIUM PHOSPHATE 4 MG: 4 INJECTION, SOLUTION INTRA-ARTICULAR; INTRALESIONAL; INTRAMUSCULAR; INTRAVENOUS; SOFT TISSUE at 08:07

## 2020-09-03 RX ADMIN — KETOROLAC TROMETHAMINE 30 MG: 30 INJECTION, SOLUTION INTRAMUSCULAR at 08:08

## 2020-09-03 RX ADMIN — BUPIVACAINE HYDROCHLORIDE 10 ML: 5 INJECTION, SOLUTION EPIDURAL; INTRACAUDAL; PERINEURAL at 07:39

## 2020-09-03 RX ADMIN — SODIUM CHLORIDE, POTASSIUM CHLORIDE, SODIUM LACTATE AND CALCIUM CHLORIDE 10 ML/HR: 600; 310; 30; 20 INJECTION, SOLUTION INTRAVENOUS at 06:50

## 2020-09-03 RX ADMIN — MIDAZOLAM 2 MG: 1 INJECTION INTRAMUSCULAR; INTRAVENOUS at 07:19

## 2020-09-03 RX ADMIN — LIDOCAINE HYDROCHLORIDE 80 MG: 20 INJECTION, SOLUTION INFILTRATION; PERINEURAL at 07:57

## 2020-09-03 SDOH — HEALTH STABILITY: MENTAL HEALTH: CURRENT SMOKER: 0

## 2020-09-03 ASSESSMENT — MIFFLIN-ST. JEOR: SCORE: 1821.54

## 2020-09-03 NOTE — DISCHARGE INSTRUCTIONS
[facility]  Adult Discharge Orders & Instructions      For 24 hours after surgery:  1. Get plenty of rest.  A responsible adult must stay with you for at least 24 hours after you leave the hospital.   2. You may feel lightheaded.  IF so, sit for a few minutes before standing.  Have someone help you get up.   3. You may have a slight fever. Call the doctor if your fever is over 101 F (38.3 C) (taken under the tongue) or lasts longer than 24 hours.  4. You may have a dry mouth, a sore throat, muscle aches or trouble sleeping.  These should go away after 24 hours.  5. Do not make important or legal decisions.  6.   Do not drive or use heavy equipment.  If you have weakness or tingling, don't drive or use heavy equipment until this feeling goes away.                                                                                                                                                                         To contact a doctor, call    [phone]______________

## 2020-09-03 NOTE — ANESTHESIA PREPROCEDURE EVALUATION
Anesthesia Pre-Procedure Evaluation    Patient: Moses Navarro   MRN: [id number] : 1970          Preoperative Diagnosis: Ankle pain [M25.579]    Procedure(s):  ARTHROSCOPY, ANKLE, WITH OPEN REPAIR OF ANKLE LIGAMENT    Past Medical History:   Diagnosis Date     Atrial fibrillation and flutter     since 2011     Injury     crush injury of the right hand     Injury     hx of c-spine injury     Injury of tendon of rotator cuff     No Comments Provided     Other injury of spleen, initial encounter     No Comments Provided     Past Surgical History:   Procedure Laterality Date     ARTHROSCOPY KNEE      Right knee     EP ABLATION FOCAL AFIB  2011    [facility]     FOOT SURGERY      foot and toe fractures due to accident     OTHER SURGICAL HISTORY      angiogram right hand       Anesthesia Evaluation     . Pt has had prior anesthetic.     No history of anesthetic complications          ROS/MED HX    ENT/Pulmonary:  - neg pulmonary ROS     Neurologic:  - neg neurologic ROS     Cardiovascular: Comment: Previous ablation    (+) ----. : . . . :. . Previous cardiac testing date:results:date: results:ECG reviewed date:20 results: date: results:          METS/Exercise Tolerance:  >4 METS   Hematologic:  - neg hematologic  ROS       Musculoskeletal:  - neg musculoskeletal ROS       GI/Hepatic:  - neg GI/hepatic ROS       Renal/Genitourinary:  - ROS Renal section negative       Endo:  - neg endo ROS       Psychiatric:  - neg psychiatric ROS       Infectious Disease:  - neg infectious disease ROS       Malignancy:      - no malignancy   Other:    - neg other ROS                      Physical Exam  Normal systems: cardiovascular, pulmonary and dental    Airway   Mallampati: I  TM distance: >3 FB  Neck ROM: full    Dental     Cardiovascular   Rhythm and rate: regular and normal      Pulmonary    breath sounds clear to auscultation            Lab Results   Component Value Date    WBC 9.2 2020    HGB 15.9  "08/12/2020    HCT 45.2 08/12/2020     08/12/2020     08/12/2020    POTASSIUM 4.1 08/12/2020    CHLORIDE 101 08/12/2020    CO2 29 08/12/2020    BUN 13 08/12/2020    CR 0.96 08/12/2020     (H) 08/12/2020    GEE 9.4 08/12/2020    ALBUMIN 4.6 08/12/2020    PROTTOTAL 7.6 08/12/2020    ALT 39 08/12/2020    AST 25 08/12/2020    ALKPHOS 42 08/12/2020    BILITOTAL 1.4 (H) 08/12/2020       Preop Vitals  BP Readings from Last 3 Encounters:   09/03/20 (!) 142/95   08/12/20 132/86   06/25/20 130/82    Pulse Readings from Last 3 Encounters:   09/03/20 78   08/12/20 94   06/25/20 88      Resp Readings from Last 3 Encounters:   09/03/20 12   08/12/20 18   02/03/20 16    SpO2 Readings from Last 3 Encounters:   09/03/20 96%   08/12/20 97%   02/03/20 98%      Temp Readings from Last 1 Encounters:   09/03/20 97.5  F (36.4  C) (Tympanic)    Ht Readings from Last 1 Encounters:   09/03/20 1.803 m (5' 11\")      Wt Readings from Last 1 Encounters:   09/03/20 93.4 kg (206 lb)    Estimated body mass index is 28.73 kg/m  as calculated from the following:    Height as of this encounter: 1.803 m (5' 11\").    Weight as of this encounter: 93.4 kg (206 lb).       Anesthesia Plan      History & Physical Review      ASA Status:  1 .    NPO Status:  > 6 hours    Plan for General and Peripheral Nerve Block (With LMA) with Intravenous induction. Maintenance will be Balanced.    PONV prophylaxis:  Ondansetron (or other 5HT-3) and Dexamethasone or Solumedrol    The patient is not a current smoker      Postoperative Care      Consents  Anesthetic plan, risks, benefits and alternatives discussed with:  Patient..                 CHARLES HEARD CRNA  "

## 2020-09-03 NOTE — ANESTHESIA POSTPROCEDURE EVALUATION
Patient: Moses Navarro    Procedure(s):  ARTHROSCOPY, ANKLE, WITH OPEN REPAIR OF ANKLE LIGAMENT    Diagnosis:Ankle pain [M25.579]  Diagnosis Additional Information: No value filed.    Anesthesia Type:  General, Peripheral Nerve Block    Note:  Anesthesia Post Evaluation    Patient location during evaluation: PACU  Patient participation: Able to fully participate in evaluation  Level of consciousness: awake and alert  Pain management: adequate  Airway patency: patent  Cardiovascular status: acceptable  Respiratory status: acceptable  Hydration status: acceptable  PONV: none     Anesthetic complications: None          Last vitals:  Vitals:    09/03/20 1015 09/03/20 1030 09/03/20 1045   BP: (!) 139/99 (!) 140/99 (!) 140/88   Pulse: 73 71 83   Resp: 14 14 14   Temp:      SpO2: 96% 97% 97%         Electronically Signed By: CHARLES Solorio CRNA  September 3, 2020  2:50 PM

## 2020-09-03 NOTE — OR NURSING
Discharge instructions given to patient and patient's spouse. No questions. Wheel chair out of unit. Denies pain, nausea or dizziness

## 2020-09-03 NOTE — ANESTHESIA PROCEDURE NOTES
Airway   Date/Time: 9/3/2020 7:59 AM   Patient location during procedure: OR    General Information and Staff   Performed: CRNA         Indications and Patient Condition  Indications for airway management: jose-procedural  Induction type:intravenous  Mask difficulty assessment: easy    Final Airway Details  Final airway type: supraglottic airway  Successful airway:LMA  Supraglottic airway: Size 4    Number of attempts at approach: 1    Secured with:tape  Ease of procedure: easy

## 2020-09-03 NOTE — BRIEF OP NOTE
[facility]    Brief Operative Note    Pre-operative diagnosis: Ankle pain [M25.579]  Post-operative diagnosis Same as pre-operative diagnosis    Procedure: Procedure(s):  ARTHROSCOPY, ANKLE, WITH OPEN REPAIR OF ANKLE LIGAMENT  Surgeon: Surgeon(s) and Role:     * Ignacio Black DPM - Primary     * Holger Vásquez PA - Assisting  Anesthesia: Combined General with Block   Estimated blood loss: Minimal  Drains: None  Specimens: * No specimens in log *  Findings:   None.  Complications: None.  Implants:   Implant Name Type Inv. Item Serial No.  Lot No. LRB No. Used Action   Fibertak DX Suture anchor with 1.3mm suture tape     [id number] Right 2 Implanted   IMP KIT REPAIR LIGAMENT AUGMENTATION INT BRACE AR-1688-CP Metallic Hardware/[employer]iner IMP KIT REPAIR LIGAMENT AUGMENTATION INT BRACE [id number]  [employer] [id number] Right 1 Implanted

## 2020-09-03 NOTE — OR NURSING
PACU Transfer Note    Moses Navarro was transferred to DSU via cart.  Equipment used for transport:  none.  Accompanied by:  Katiana RANDOLPH RN  Prescriptions were: sent with patient in chart    PACU Respiratory Event Documentation     1) Episodes of Apnea greater than or equal to 10 seconds: 0    2) Bradypnea - less than 8 breaths per minute: 0    3) Pain score on 0 to 10 scale: 0    4) Pain-sedation mismatch (yes or no): no    5) Repeated 02 desaturation less than 90% (yes or no): no    Anesthesia notified? (yes or no): na    Any of the above events occuring repeatedly in separate 30 minute intervals may be considered recurrent PACU respiratory events.    Patient stable and meets phase 1 discharge criteria for transport from PACU.

## 2020-09-03 NOTE — OP NOTE
Procedure Date: 09/03/2020      PREOPERATIVE DIAGNOSIS:  Right ankle pain, chronic instability.      POSTOPERATIVE DIAGNOSIS:  Right ankle pain, chronic instability.      PROCEDURES:   1.  Ankle scope with extensive debridement.   2.  Open lateral ligament repair, both ligaments.      SURGEON:  Ignacio Black DPM      ANESTHESIA:  General with popliteal saphenous block.      HEMOSTASIS:  Thigh tourniquet, electrocautery.      ESTIMATED BLOOD LOSS:  Minimal.      MATERIALS:  Arthrex FiberTak anchors x 2, Arthrex internal brace with a 3.5 mm anchor in the talus and a 4.75 mm in the fibula.      INDICATIONS FOR PROCEDURE:  This is a 50-year-old who has had persistent pain and multiple sprains associated with this.  On exam this morning, he states he rolled his ankle 2 times last week.  He would like to proceed with stabilization and arthroscopy after a detailed discussion of risks, potential complications, alternatives and benefits.      DESCRIPTION OF PROCEDURE:  Supine position utilized.  General anesthesia and popliteal saphenous block performed.  Right lower extremity prepped and draped in the usual sterile fashion.  Thigh tourniquet utilized for portion of the procedure.  I also performed an ankle block  intraoperatively.  Total Marcaine was under limit despite both popliteal, saphenous, and ankle block.      Attention was directed to anterior ankle.  Standard anterior ankle arthroscopy portal was established.  Care taken to protect neurovascular tendinous structures.  4-0 scope introduced medially. A 3.5 shaver as well as radiofrequency coblation utilized from the lateral portal for debridement.  Thorough evaluation performed.  Had extensive inflammatory changes anteriorly, particularly laterally.  Some mild inflammatory changes associated with deltoid ligament, which was debrided.  Extensive debridement carried out.  Mild tibial spurring resected with a shaver.  The joint surface was evaluated and was largely  intact.  Did have some distal syndesmotic debridement and a mildly thickened Middleburg's ligament, all of which was debrided.  Joint was flushed with normal saline.  Joint surface was otherwise largely intact.  Arthroscopic portal was then closed after the joint was flushed.  Attention directed laterally.  A slight curvilinear incision made from proximal to distal along the distal fibula, deepened using blunt and sharp dissection.  The ATF/CF ligament, joint capsule transected right along the anterior curvature of the distal fibula.  Anterior fibula exposed, roughened with a rongeur and a rasp.  Two FiberTak anchors inserted into the distal fibula, spaced approximately a centimeter apart distal to the tibiotalar joint.  Proximal sutures passed through the ATF ligament and joint capsule.  Distal sutures through the CF ligament and peroneal retinaculum.  These were thrown in a fashion to imbricate these structures to tighten the ligaments.  When they were tightened down, this effectively tightened the ligamentous complex.  This was then augmented with an internal brace.  A 3.5 anchor was thrown into the talar neck-body junction, traversing the mid substance of the body.  FiberTape from the anchor then run extracapsularly into the fibula appropriately tensioned with the ATF ligament under max tension to avoid over tightening the 4.5 mm anchor.  Then secured these anchors into the fibula.  Flushed with copious amounts of normal saline.  Deep tissue repaired with 2-0 Vicryl, skin with nonabsorbable suture.  Had excellent stability.  Sterile dressing, posterior sugar-tong splint.        He will be nonweightbearing.  Follow up as scheduled.         REBECCA SCHREIBER DPM             D: 2020   T: 2020   MT: ARIA      Name:     RADHA BOURNE   MRN:      [id number]        Account:        [id number]   :      1970           Procedure Date: 2020      Document: [id number]

## 2020-09-03 NOTE — ANESTHESIA CARE TRANSFER NOTE
Patient: Moses Navarro    Procedure(s):  ARTHROSCOPY, ANKLE, WITH OPEN REPAIR OF ANKLE LIGAMENT    Diagnosis: Ankle pain [M25.579]  Diagnosis Additional Information: No value filed.    Anesthesia Type:   General, Peripheral Nerve Block     Note:  Airway :Room Air  Patient transferred to:PACU  Handoff Report: Identifed the Patient, Identified the Reponsible Provider, Reviewed the pertinent medical history, Discussed the surgical course, Reviewed Intra-OP anesthesia mangement and issues during anesthesia, Set expectations for post-procedure period and Allowed opportunity for questions and acknowledgement of understanding      Vitals: (Last set prior to Anesthesia Care Transfer)    CRNA VITALS  9/3/2020 0903 - 9/3/2020 0936      9/3/2020             Resp Rate (set):  10                Electronically Signed By: CHARLES HEARD CRNA  September 3, 2020  9:36 AM

## 2020-09-03 NOTE — ANESTHESIA PROCEDURE NOTES
Procedure note :  Staff -       CRNA: Jose D Billings APRN CRNA    Performed By: CRNA        Pre-Procedure    Location: pre-op    Procedure Times:9/3/2020 7:20 AM and 9/3/2020 7:40 AM  Pre-Anesthestic Checklist: patient identified, IV checked, site marked, risks and benefits discussed, informed consent, monitors and equipment checked, pre-op evaluation, at physician/surgeon's request and post-op pain management    Timeout  Correct Patient: Yes   Correct Procedure: Yes   Correct Site: Yes   Correct Laterality: Yes   Correct Position: Yes   Site Marked: Yes   .   Procedure Documentation    Diagnosis:POST-OP PAIN CONTROL.      .  .       Assessment/Narrative  .  .  . Comments:  Ultrasound Guided Popliteal Sciatic and Saphenous Nerve Block for Post-Op Pain Management    Patient: RADHA BOURNE  Patient : 1970  Date: September 3, 2020  Procedure time:  720 to 740  Diagnosis: Ankle pain.    Surgeon requests Popliteal/ Saphenous nerve block for post-op pain management.    The procedure, potential benefits, risks, and alternatives were discussed during the preoperative interview with the patient.  The patient voiced understanding of the information and agreed to proceed with the procedure.    Universal protocol was followed.  TIME OUT conducted just prior to starting procedure confirmed patient identity, site/side, procedure, patient position and availability of correct equipment and implants.    Anesthesia: 0.5% Bupivicaine subcutaneously  Sedation-2mg Versed IV by DSU RN    The right lateral thigh was prepped with chlohexidine.  A 22 gauge 4 inch Stimuplex insulated needle was advanced under ultrasound guidance until adjacent to but not within the sciatic nerve at the level of the nerve bifurcation.  20ml of 0.5% Bupivacaine was injected incrementally with confirmation of negative aspiration. There was good visualization of local anesthetic spread around the sciatic nerve with no signs of intraneural  orintravascular injection. No parasthesias were elicited either during needle placement or medication injection. There were no other immediate complications apparent. An image was saved to the ultrasound machine and a print was made for the chart.    An additional 10cc of 0.5% Bupivicaine with 2mg of PF Decadron was placed around the right saphenous nerve, on the medial aspect of the mid-thigh under the Sartorius muscle, utilizing a 22 gauge 4 inch Stimuplexinsulated needle and ultrasound guidance after the area was prepped with chlorhexidine. No parasthesias were elicited either during needle placement or medication injection.   There were no other immediate complications apparent. An image was saved to the ultrasound machine and a print was made for the chart.    Electronically Signed by  CHARLES HEARD CRNA on 9/3/2020 at 8:20 AM

## 2020-09-10 ENCOUNTER — OFFICE VISIT (OUTPATIENT)
Dept: ORTHOPEDICS | Facility: OTHER | Age: 50
End: 2020-09-10
Attending: PODIATRIST
Payer: COMMERCIAL

## 2020-09-10 DIAGNOSIS — Z98.890 S/P ANKLE LIGAMENT REPAIR: Primary | ICD-10-CM

## 2020-09-10 PROCEDURE — 99024 POSTOP FOLLOW-UP VISIT: CPT | Performed by: PODIATRIST

## 2020-09-10 NOTE — PROGRESS NOTES
Visit Date:   09/10/2020      The patient is here a week out from ankle scope with extensive debridement, open lateral ligament repair.  Doing well, no acute concern.      PHYSICAL EXAMINATION:  Surgical site is well-healed, sutures removed, steri strips applied.  CMS is intact.  Normal postoperative edema.  Does have some soreness, which is anticipated with this, but overall this looks good.      ASSESSMENT:  Status post right ankle surgery as described.        PLAN:  I discussed condition and progression of postoperative course.  The patient is doing quite well.  We will put a therapy order in for him to start progressing.  He is going to be out of town next week, so we will start a week after, followup in 4 weeks.  Otherwise, continue progressive weightbearing.  We will do accelerated protocol given internal brace stabilization.         REBECCA SCHREIBER DPM             D: 09/10/2020   T: 09/10/2020   MT: SHENG      Name:     RADHA BOURNE   MRN:      [id number]        Account:      [id number]   :      1970           Visit Date:   09/10/2020      Document: [id number]

## 2020-09-10 NOTE — PROGRESS NOTES
Patient is here for follow up on his right ankle scope.  Maritza Gooden LPN .....................9/10/2020 9:18 AM

## 2020-09-22 ENCOUNTER — HOSPITAL ENCOUNTER (OUTPATIENT)
Dept: PHYSICAL THERAPY | Facility: OTHER | Age: 50
Setting detail: THERAPIES SERIES
End: 2020-09-22
Attending: PODIATRIST
Payer: COMMERCIAL

## 2020-09-22 DIAGNOSIS — Z98.890 S/P ANKLE LIGAMENT REPAIR: ICD-10-CM

## 2020-09-22 PROCEDURE — 97110 THERAPEUTIC EXERCISES: CPT | Mod: GP

## 2020-09-22 PROCEDURE — 97161 PT EVAL LOW COMPLEX 20 MIN: CPT | Mod: GP

## 2020-09-23 NOTE — PROGRESS NOTES
09/22/20 1400   General Information   Type of Visit Initial OP Ortho PT Evaluation   Start of Care Date 09/22/20   Referring Physician Dr. Ash    Patient/Family Goals Statement improve function and mobility    Orders Evaluate and Treat   Orders Comment ROM- No passive manipulation, strenthening, gait training   Date of Order 09/10/20   Certification Required? No   Medical Diagnosis S/P ankle ligament repair Z98.890    Surgical/Medical history reviewed Yes   Body Part(s)   Body Part(s) Ankle/Foot   Presentation and Etiology   Pertinent history of current problem (include personal factors and/or comorbidities that impact the POC) Patient is s/p right ankle lateral ligament repair on 9/3/2020.  He is currently wearing a CAM boot.  He has been able to progress to weightbearing as tolerated.  Patient states he has been quite active with walking in the boot. He has been working on ankle plantarflexion and dorsiflexion ROM as instructed by Dr. Black at his last orthopedic appointment.    Impairments A. Pain   Functional Limitations perform activities of daily living;perform required work activities;perform desired leisure / sports activities   Symptom Location lateral and anterior right ankle    Onset date of current episode/exacerbation 09/03/20   Chronicity New   Pain rating (0-10 point scale) Best (/10);Worst (/10)   Best (/10) 0/10   Worst (/10) 8/10    Pain quality A. Sharp;B. Dull;C. Aching;E. Shooting;D. Burning;F. Stabbing   Frequency of pain/symptoms B. Intermittent   Pain/symptoms exacerbated by A. Sitting;B. Walking;K. Home tasks   Pain/symptoms eased by H. Cold  (elevating the right LE )   Prior Level of Function   Prior Level of Function-Mobility No limitations    Prior Level of Function-ADLs No limitations    Current Level of Function   Current Community Support Family/friend caregiver   Patient role/employment history A. Employed  ()   Fall Risk Screen   Fall screen completed by  PT   Have you fallen 2 or more times in the past year? No   Have you fallen and had an injury in the past year? No   Is patient a fall risk? No   Ankle/Foot Objective Findings   Side (if bilateral, select both right and left) Right   Observation Mild swelling over the lateral ankle    Integumentary healing incision and portal sites with a few steri strips in place, No redness or drainage.    Gait/Locomotion Walks with a CAM boot on right LE   Ankle/Foot Strength Comments Ankle DF=4/5,  Ankle PF=4/5   Palpation Not indicated due to post op status   Right DF (Knee Ext) AROM 3   Right PF AROM 50   Planned Therapy Interventions   Planned Therapy Interventions gait training;neuromuscular re-education;ROM;strengthening   Planned Modality Interventions   Planned Modality Interventions Cryotherapy   Clinical Impression   Criteria for Skilled Therapeutic Interventions Met yes, treatment indicated   PT Diagnosis S/P right ankle lateral ligament repair   Influenced by the following impairments pain, impaired ROM, weakness, Impaired gait, impaired neuromuscular control    Functional limitations due to impairments walking on indoor and outdoor surfaces, prolonged sitting, recreation,    Clinical Presentation Stable/Uncomplicated   Clinical Presentation Rationale symptoms are consistent with the diagnosis    Clinical Decision Making (Complexity) Low complexity   Therapy Frequency   (12 visits )   Predicted Duration of Therapy Intervention (days/wks) 8 weeks    Risk & Benefits of therapy have been explained Yes   Patient, Family & other staff in agreement with plan of care Yes   Education Assessment   Preferred Learning Style Listening;Demonstration   ORTHO GOALS   PT Ortho Eval Goals 1;3;2   Ortho Goal 1   Goal Identifier Pain    Goal Description Report pain at 2/10 or less to allow riding 2 hours in a car without difficulty    Target Date 11/18/20   Ortho Goal 2   Goal Identifier Gait    Goal Description Demontrate a normal gait  pattern to allow return to walking 30 minutes for shopping tasks without difficulty    Target Date 11/18/20   Ortho Goal 3   Goal Identifier Weakness   Goal Description Improve ankle strength to 5/5 in all planes to allow walking on outdoor surfaces with no feeling of weakness or instability.    Target Date 11/18/20   Total Evaluation Time   PT Eval, Low Complexity Minutes (53017) 15

## 2020-10-08 ENCOUNTER — OFFICE VISIT (OUTPATIENT)
Dept: ORTHOPEDICS | Facility: OTHER | Age: 50
End: 2020-10-08
Attending: OTOLARYNGOLOGY
Payer: COMMERCIAL

## 2020-10-08 ENCOUNTER — HOSPITAL ENCOUNTER (OUTPATIENT)
Dept: PHYSICAL THERAPY | Facility: OTHER | Age: 50
Setting detail: THERAPIES SERIES
End: 2020-10-08
Attending: PODIATRIST
Payer: COMMERCIAL

## 2020-10-08 DIAGNOSIS — Z98.890 S/P ANKLE LIGAMENT REPAIR: ICD-10-CM

## 2020-10-08 DIAGNOSIS — Z98.890 S/P ANKLE LIGAMENT REPAIR: Primary | ICD-10-CM

## 2020-10-08 PROCEDURE — 97110 THERAPEUTIC EXERCISES: CPT | Mod: GP

## 2020-10-08 PROCEDURE — 99024 POSTOP FOLLOW-UP VISIT: CPT | Performed by: PODIATRIST

## 2020-10-08 NOTE — PROGRESS NOTES
Visit Date:   10/08/2020      HISTORY OF PRESENT ILLNESS:  Moses is here 5 weeks out from ankle scope with extensive debridement and open lateral ligament repair.  He comes in walking in normal shoes today fairly comfortably.  He still has some lateral symptoms at the repair site, but overall doing quite well.      PHYSICAL EXAMINATION:  Surgical site well-healed.  Minimal swelling.  CMS is intact.  No signs of infection.  Good stability of the ankle is appreciated.      ASSESSMENT:  Status post right ankle surgery as described.      PLAN:  I discussed progression of treatment.  At this point, we will start therapy, strengthening, range of motion in the sagittal plane and gait training.  I will see him back in 4 weeks.  He does have an internal brace, so he can progress a little bit quicker than otherwise.  Most likely release in 4 weeks if doing well.         REBECCA SCHREIBER DPM             D: 10/08/2020   T: 10/08/2020   MT: THOMAS      Name:     MOSES BOURNE   MRN:      [id number]        Account:      [id number]   :      1970           Visit Date:   10/08/2020      Document: [id number]

## 2020-10-08 NOTE — PROGRESS NOTES
Patient is here for follow up on his right ankle scope.  Maritza Gooden LPN .....................10/8/2020 8:42 AM

## 2020-11-04 ENCOUNTER — HOSPITAL ENCOUNTER (OUTPATIENT)
Dept: PHYSICAL THERAPY | Facility: OTHER | Age: 50
Setting detail: THERAPIES SERIES
End: 2020-11-04
Attending: INTERNAL MEDICINE
Payer: COMMERCIAL

## 2020-11-04 PROCEDURE — 97110 THERAPEUTIC EXERCISES: CPT | Mod: GP

## 2020-12-27 ENCOUNTER — HEALTH MAINTENANCE LETTER (OUTPATIENT)
Age: 50
End: 2020-12-27

## 2021-01-28 NOTE — PROGRESS NOTES
Outpatient Physical Therapy Discharge Note     Patient: Moses Navarro  : 1970    Beginning/End Dates of Reporting Period:  2020 to 2020    Referring Provider: Dr. Ash    Therapy Diagnosis: S/P ankle ligament repair Z98.890      Client Self Report: Patient reports continued progress.  He reports he was able to hike 4 miles in the woods wearing his ankle brace and hunting boot withotu difficutly. Reports the HEP is going well. He has a follow up with Dr. Black next week. Patient like to begind trial of independent HEP and manangement. He will call if an additional appointment is required.     Patient did not require any additional physical therapy follow-up after his appointment on 2020.    Objective Measurements on 2020:  Objective Measure: Right ankle MMT  Details: DF=5/5,  PF=5/5,  IV=5-/5  EV=4+/5      Goals:  Goal Identifier Pain    Goal Description Report pain at 2/10 or less to allow riding 2 hours in a car without difficulty    Target Date 20   Date Met      Progress: Progressing at time of final visit     Goal Identifier Gait    Goal Description Demontrate a normal gait pattern to allow return to walking 30 minutes for shopping tasks without difficulty    Target Date 20   Date Met      Progress: Progressing at time of final visit     Goal Identifier Weakness   Goal Description Improve ankle strength to 5/5 in all planes to allow walking on outdoor surfaces with no feeling of weakness or instability.    Target Date 20   Date Met      Progress: Progressing at time of final visit       Progress Toward Goals:   Progress this reporting period: Patient was able to transition to independent home exercise program and symptom management techniques.  He did not require any additional follow-up.    Plan:  Discharge from therapy.    Discharge:    Reason for Discharge: Patient chooses to discontinue therapy.      Discharge Plan: Patient to continue home program.

## 2021-02-21 ENCOUNTER — ALLIED HEALTH/NURSE VISIT (OUTPATIENT)
Dept: FAMILY MEDICINE | Facility: OTHER | Age: 51
End: 2021-02-21
Attending: FAMILY MEDICINE
Payer: COMMERCIAL

## 2021-02-21 DIAGNOSIS — R68.83 CHILLS: Primary | ICD-10-CM

## 2021-02-21 LAB
LABORATORY COMMENT REPORT: ABNORMAL
SARS-COV-2 RNA RESP QL NAA+PROBE: NORMAL
SARS-COV-2 RNA RESP QL NAA+PROBE: POSITIVE
SPECIMEN SOURCE: ABNORMAL
SPECIMEN SOURCE: NORMAL

## 2021-02-21 PROCEDURE — C9803 HOPD COVID-19 SPEC COLLECT: HCPCS

## 2021-02-21 PROCEDURE — 87635 SARS-COV-2 COVID-19 AMP PRB: CPT | Mod: ZL | Performed by: FAMILY MEDICINE

## 2021-04-25 ENCOUNTER — HEALTH MAINTENANCE LETTER (OUTPATIENT)
Age: 51
End: 2021-04-25

## 2021-05-24 ENCOUNTER — OFFICE VISIT (OUTPATIENT)
Dept: FAMILY MEDICINE | Facility: OTHER | Age: 51
End: 2021-05-24
Attending: FAMILY MEDICINE
Payer: COMMERCIAL

## 2021-05-24 VITALS
WEIGHT: 215 LBS | HEIGHT: 72 IN | SYSTOLIC BLOOD PRESSURE: 130 MMHG | OXYGEN SATURATION: 97 % | HEART RATE: 80 BPM | RESPIRATION RATE: 18 BRPM | TEMPERATURE: 97.4 F | DIASTOLIC BLOOD PRESSURE: 74 MMHG | BODY MASS INDEX: 29.12 KG/M2

## 2021-05-24 DIAGNOSIS — N50.89 MASS OF RIGHT TESTICLE: Primary | ICD-10-CM

## 2021-05-24 PROCEDURE — 99213 OFFICE O/P EST LOW 20 MIN: CPT | Performed by: FAMILY MEDICINE

## 2021-05-24 ASSESSMENT — ANXIETY QUESTIONNAIRES
1. FEELING NERVOUS, ANXIOUS, OR ON EDGE: NOT AT ALL
2. NOT BEING ABLE TO STOP OR CONTROL WORRYING: NOT AT ALL
IF YOU CHECKED OFF ANY PROBLEMS ON THIS QUESTIONNAIRE, HOW DIFFICULT HAVE THESE PROBLEMS MADE IT FOR YOU TO DO YOUR WORK, TAKE CARE OF THINGS AT HOME, OR GET ALONG WITH OTHER PEOPLE: NOT DIFFICULT AT ALL
3. WORRYING TOO MUCH ABOUT DIFFERENT THINGS: NOT AT ALL
7. FEELING AFRAID AS IF SOMETHING AWFUL MIGHT HAPPEN: NOT AT ALL
6. BECOMING EASILY ANNOYED OR IRRITABLE: NOT AT ALL
GAD7 TOTAL SCORE: 0
5. BEING SO RESTLESS THAT IT IS HARD TO SIT STILL: NOT AT ALL

## 2021-05-24 ASSESSMENT — MIFFLIN-ST. JEOR: SCORE: 1874.26

## 2021-05-24 ASSESSMENT — PATIENT HEALTH QUESTIONNAIRE - PHQ9
5. POOR APPETITE OR OVEREATING: NOT AT ALL
SUM OF ALL RESPONSES TO PHQ QUESTIONS 1-9: 0

## 2021-05-24 ASSESSMENT — PAIN SCALES - GENERAL: PAINLEVEL: NO PAIN (0)

## 2021-05-24 NOTE — PROGRESS NOTES
"Nursing Notes:   Sheila Viera LPN  5/24/2021  2:12 PM  Signed  Patient presents to the clinic for lump on the right testicle, patient found this last week and is unsure how long it has been there.      Chief Complaint   Patient presents with     Mass       Initial /74 (BP Location: Right arm, Patient Position: Sitting, Cuff Size: Adult Regular)   Pulse 80   Temp 97.4  F (36.3  C) (Temporal)   Resp 18   Ht 1.822 m (5' 11.75\")   Wt 97.5 kg (215 lb)   SpO2 97%   BMI 29.36 kg/m   Estimated body mass index is 29.36 kg/m  as calculated from the following:    Height as of this encounter: 1.822 m (5' 11.75\").    Weight as of this encounter: 97.5 kg (215 lb).  Medication Reconciliation: complete    Sheila Viera LPN         Assessment & Plan       ICD-10-CM    1. Mass of right testicle  N50.89 US Testicular and Scrotum     Based on exam, he likely has a varicocele, but the mass is adjacent to the testicle and cannot clearly be .  Strongly recommend scrotal ultrasound for further assessment.  If the mass is concerning for testicular cancer, then labs are needed for further assessment.  We elected against drawing labs and waiting for ultrasound results.    If varicocele, we discussed management issues such as scrotal support and icing.  If this worsens over time, he could see urology    Follow up pending ultrasound       Jose M De La Cruz MD     [facility]      SUBJECTIVE:  50 year old male presents for right testicle lump  No pain  Uncertain duration, just noticed last week  No change since last week  No night sweats, anorexia, or fatigue  Does have a history of vasectomy    REVIEW OF SYSTEMS:    Pertinent items are noted in HPI.    No current outpatient medications on file.     No Known Allergies    OBJECTIVE:  /74 (BP Location: Right arm, Patient Position: Sitting, Cuff Size: Adult Regular)   Pulse 80   Temp 97.4  F (36.3  C) (Temporal)   Resp 18   Ht 1.822 m (5' " "11.75\")   Wt 97.5 kg (215 lb)   SpO2 97%   BMI 29.36 kg/m      EXAM:  General Appearance: Alert. No acute distress  Psychiatric: Normal affect and mentation  : No hernias.  Left testicle is normal without masses.  Has a small varicocele in the left upper scrotum.  Right testicle is normal in size.  There is an epididymal cyst in the inferior scrotum.  The superior portion of the testicle has a 1 cm mass that is adjacent to, but not clearly adhered to the testicle.  Has a slightly ropey quality and is potentially a varicocele.   "

## 2021-05-24 NOTE — NURSING NOTE
"Patient presents to the clinic for lump on the right testicle, patient found this last week and is unsure how long it has been there.      Chief Complaint   Patient presents with     Mass       Initial /74 (BP Location: Right arm, Patient Position: Sitting, Cuff Size: Adult Regular)   Pulse 80   Temp 97.4  F (36.3  C) (Temporal)   Resp 18   Ht 1.822 m (5' 11.75\")   Wt 97.5 kg (215 lb)   SpO2 97%   BMI 29.36 kg/m   Estimated body mass index is 29.36 kg/m  as calculated from the following:    Height as of this encounter: 1.822 m (5' 11.75\").    Weight as of this encounter: 97.5 kg (215 lb).  Medication Reconciliation: complete    Sheila Viera LPN    "

## 2021-05-25 ASSESSMENT — ANXIETY QUESTIONNAIRES: GAD7 TOTAL SCORE: 0

## 2021-05-27 ENCOUNTER — HOSPITAL ENCOUNTER (OUTPATIENT)
Dept: ULTRASOUND IMAGING | Facility: OTHER | Age: 51
Discharge: HOME | End: 2021-05-27
Attending: FAMILY MEDICINE | Admitting: FAMILY MEDICINE
Payer: COMMERCIAL

## 2021-05-27 DIAGNOSIS — N50.89 MASS OF RIGHT TESTICLE: ICD-10-CM

## 2021-05-27 PROCEDURE — 93976 VASCULAR STUDY: CPT

## 2021-05-27 PROCEDURE — 76870 US EXAM SCROTUM: CPT

## 2021-10-09 ENCOUNTER — HEALTH MAINTENANCE LETTER (OUTPATIENT)
Age: 51
End: 2021-10-09

## 2022-01-14 ENCOUNTER — MYC MEDICAL ADVICE (OUTPATIENT)
Dept: FAMILY MEDICINE | Facility: OTHER | Age: 52
End: 2022-01-14
Payer: COMMERCIAL

## 2022-01-14 DIAGNOSIS — N50.89 MASS OF RIGHT TESTICLE: Primary | ICD-10-CM

## 2022-01-14 NOTE — TELEPHONE ENCOUNTER
Referral to Urology pending. Please sign if appropriate.     Maureen Ibarra CMA on 1/14/2022 at 4:15 PM

## 2022-01-26 ENCOUNTER — OFFICE VISIT (OUTPATIENT)
Dept: UROLOGY | Facility: OTHER | Age: 52
End: 2022-01-26
Attending: FAMILY MEDICINE
Payer: COMMERCIAL

## 2022-01-26 VITALS
WEIGHT: 214 LBS | HEART RATE: 106 BPM | SYSTOLIC BLOOD PRESSURE: 138 MMHG | RESPIRATION RATE: 16 BRPM | OXYGEN SATURATION: 98 % | BODY MASS INDEX: 29.23 KG/M2 | DIASTOLIC BLOOD PRESSURE: 88 MMHG

## 2022-01-26 DIAGNOSIS — N50.89 MASS OF RIGHT TESTICLE: ICD-10-CM

## 2022-01-26 PROCEDURE — 99203 OFFICE O/P NEW LOW 30 MIN: CPT | Performed by: UROLOGY

## 2022-01-26 ASSESSMENT — PAIN SCALES - GENERAL: PAINLEVEL: NO PAIN (0)

## 2022-01-26 NOTE — PROGRESS NOTES
Type of Visit  NPV    Chief Complaint  Right epididymal cyst    HPI  Mr. Navarro is a 51 year old male who presents with right epididymal cyst.  A few months ago the patient noticed the right sided testicle was different on self-exam than the left.  He saw his primary and underwent a scrotal ultrasound confirming an epididymal head cyst.  The lesion has been somewhat stable in size and causes minimal discomfort.  The patient follows up to confirm diagnosis and management.  He denies any other associated symptoms.  No pain today.  He does not use a jockstrap.      Past Medical History  He  has a past medical history of Atrial fibrillation and flutter, Injury, Injury, Injury of tendon of rotator cuff, and Other injury of spleen, initial encounter.  Patient Active Problem List   Diagnosis     Atrial flutter (H)     Crush injury of hand     Hematuria     Insomnia, persistent     Other dyspnea and respiratory abnormality       Past Surgical History  He  has a past surgical history that includes Arthroscopy knee; OTHER SURGICAL HISTORY; EP Ablation Atrial Fibrilation (09/29/2011); Foot surgery; and Arthroscopy ankle, open repair ligament, combined (Right, 9/3/2020).    Medications  He currently has no medications in their medication list.    Allergies  No Known Allergies    Social History  He  reports that he has never smoked. He has never used smokeless tobacco. He reports current alcohol use of about 6.0 standard drinks of alcohol per week. He reports that he does not use drugs.  No drug abuse.    Family History  Family History   Problem Relation Age of Onset     Diabetes Father         Diabetes,type II     Hypertension Father         Hypertension     Family History Negative Mother         Good Health     Family History Negative Sister         Good Health     Heart Disease Other      Diabetes Paternal Uncle      Prostate Cancer No family hx of      Colon Cancer No family hx of        Review of Systems  I personally  reviewed the ROS with the patient.    Nursing Notes:   ReyeshernandezMirna LPN  1/26/2022  8:31 AM  Signed  Chief Complaint   Patient presents with     Follow Up     mass on right testicle   Patient presents to the clinic today for a follow up for a mass on right testicle    Review of Systems:    Weight loss:    No     Recent fever/chills:  No   Night sweats:   No  Current skin rash:  No   Recent hair loss:  No  Heat intolerance:  No   Cold intolerance:  No  Chest pain:   No   Palpitations:   No  Shortness of breath:  No   Wheezing:   No  Constipation:    No   Diarrhea:   No   Nausea:   No   Vomiting:   No   Kidney/side pain:  No   Back pain:   No  Frequent headaches:  No   Dizziness:     No  Leg swelling:   No   Calf pain:    No      Medication Reconciliation: completed   Mirna RADHA Hoff  1/26/2022 8:30 AM       Physical Exam  Vitals:    01/26/22 0835   BP: 138/88   BP Location: Right arm   Patient Position: Sitting   Cuff Size: Adult Large   Pulse: 106   Resp: 16   SpO2: 98%   Weight: 97.1 kg (214 lb)     Constitutional: No acute distress.  Alert and cooperative   Head: NCAT  Eyes: Conjunctivae normal  Cardiovascular: Regular rate.  Pulmonary/Chest: Respirations are even and non-labored bilaterally, no audible wheezing  Abdominal: Soft. No distension, tenderness, masses or guarding.   Neurological: A + O x 3.  Cranial Nerves II-XII grossly intact.  Extremities: RAUL x 4, Warm. No clubbing.  No cyanosis.    Skin: Pink, warm and dry.  No visible rashes noted.  Psychiatric:  Normal mood and affect  Back:  No left CVA tenderness.  No right CVA tenderness.  Genitourinary:  Nonpalpable bladder  Right epididymal head cyst    Labs  None    Imaging  Scrotal US  5/27/2022  I personally reviewed and interpreted the images and report.  MEASUREMENTS:   Right testis: 4.4 x 3.2 x 2.2 centimeters (Length x AP x Width)  Echogenicity of the parenchyma is uniform. There is no evidence of  mass. Blood flow is detected within the  parenchyma at the palpable  area of concern is an epididymal cyst measuring 2.4 x 1.9 x 1.6 cm in  dimension.  Left testis: 4.4 x 3.0 x 2.2 centimeters (Length x AP x Width)  Echogenicity of the parenchyma is uniform. There is no evidence of  mass.  Blood flow is detected within the parenchyma.                                             IMPRESSION: There is a right epididymal cyst correlating with the  palpable area of concern. There is no evidence of testicular mass or  torsion.    Assessment & Plan  Mr. Navarro is a 51 year old male who presents with right epididymal cyst.  Physical exam is concordant with ultrasound findings listed above.  I strongly recommended observation but we also discussed surgical excision is an option if the cyst is symptomatic enough to justify intervention..  Explained that he can expect he will likely remain about the same size however it can shrink or increase in size with time.  Follow-up with any other concerns or changes on exam.

## 2022-01-26 NOTE — NURSING NOTE
Chief Complaint   Patient presents with     Follow Up     mass on right testicle   Patient presents to the clinic today for a follow up for a mass on right testicle    Review of Systems:    Weight loss:    No     Recent fever/chills:  No   Night sweats:   No  Current skin rash:  No   Recent hair loss:  No  Heat intolerance:  No   Cold intolerance:  No  Chest pain:   No   Palpitations:   No  Shortness of breath:  No   Wheezing:   No  Constipation:    No   Diarrhea:   No   Nausea:   No   Vomiting:   No   Kidney/side pain:  No   Back pain:   No  Frequent headaches:  No   Dizziness:     No  Leg swelling:   No   Calf pain:    No      Medication Reconciliation: completed   Mirna Hoff LPN  1/26/2022 8:30 AM

## 2022-05-21 ENCOUNTER — HEALTH MAINTENANCE LETTER (OUTPATIENT)
Age: 52
End: 2022-05-21

## 2022-09-17 ENCOUNTER — HEALTH MAINTENANCE LETTER (OUTPATIENT)
Age: 52
End: 2022-09-17

## 2022-10-03 ENCOUNTER — APPOINTMENT (OUTPATIENT)
Dept: GENERAL RADIOLOGY | Facility: OTHER | Age: 52
End: 2022-10-03
Attending: EMERGENCY MEDICINE
Payer: COMMERCIAL

## 2022-10-03 ENCOUNTER — HOSPITAL ENCOUNTER (EMERGENCY)
Facility: OTHER | Age: 52
Discharge: HOME | End: 2022-10-03
Attending: EMERGENCY MEDICINE | Admitting: EMERGENCY MEDICINE
Payer: COMMERCIAL

## 2022-10-03 VITALS
HEIGHT: 71 IN | SYSTOLIC BLOOD PRESSURE: 117 MMHG | RESPIRATION RATE: 16 BRPM | WEIGHT: 215 LBS | BODY MASS INDEX: 30.1 KG/M2 | HEART RATE: 71 BPM | OXYGEN SATURATION: 99 % | TEMPERATURE: 98.5 F | DIASTOLIC BLOOD PRESSURE: 102 MMHG

## 2022-10-03 DIAGNOSIS — I48.0 PAROXYSMAL ATRIAL FIBRILLATION (H): ICD-10-CM

## 2022-10-03 LAB
ALBUMIN SERPL-MCNC: 4.3 G/DL (ref 3.5–5.7)
ALP SERPL-CCNC: 42 U/L (ref 34–104)
ALT SERPL W P-5'-P-CCNC: 30 U/L (ref 7–52)
ANION GAP SERPL CALCULATED.3IONS-SCNC: 10 MMOL/L (ref 3–14)
AST SERPL W P-5'-P-CCNC: 22 U/L (ref 13–39)
ATRIAL RATE - MUSE: 192 BPM
ATRIAL RATE - MUSE: 63 BPM
BASOPHILS # BLD AUTO: 0.1 10E3/UL (ref 0–0.2)
BASOPHILS NFR BLD AUTO: 1 %
BILIRUB SERPL-MCNC: 1.3 MG/DL (ref 0.3–1)
BUN SERPL-MCNC: 17 MG/DL (ref 7–25)
CALCIUM SERPL-MCNC: 9.6 MG/DL (ref 8.6–10.3)
CHLORIDE BLD-SCNC: 103 MMOL/L (ref 98–107)
CO2 SERPL-SCNC: 25 MMOL/L (ref 21–31)
CREAT SERPL-MCNC: 1.03 MG/DL (ref 0.7–1.3)
DIASTOLIC BLOOD PRESSURE - MUSE: NORMAL MMHG
DIASTOLIC BLOOD PRESSURE - MUSE: NORMAL MMHG
EOSINOPHIL # BLD AUTO: 0.1 10E3/UL (ref 0–0.7)
EOSINOPHIL NFR BLD AUTO: 1 %
ERYTHROCYTE [DISTWIDTH] IN BLOOD BY AUTOMATED COUNT: 12.3 % (ref 10–15)
GFR SERPL CREATININE-BSD FRML MDRD: 88 ML/MIN/1.73M2
GLUCOSE BLD-MCNC: 110 MG/DL (ref 70–105)
HCT VFR BLD AUTO: 49.3 % (ref 40–53)
HGB BLD-MCNC: 17.4 G/DL (ref 13.3–17.7)
HOLD SPECIMEN: NORMAL
HOLD SPECIMEN: NORMAL
IMM GRANULOCYTES # BLD: 0 10E3/UL
IMM GRANULOCYTES NFR BLD: 0 %
INTERPRETATION ECG - MUSE: NORMAL
INTERPRETATION ECG - MUSE: NORMAL
LYMPHOCYTES # BLD AUTO: 2.1 10E3/UL (ref 0.8–5.3)
LYMPHOCYTES NFR BLD AUTO: 27 %
MCH RBC QN AUTO: 30.9 PG (ref 26.5–33)
MCHC RBC AUTO-ENTMCNC: 35.3 G/DL (ref 31.5–36.5)
MCV RBC AUTO: 87 FL (ref 78–100)
MONOCYTES # BLD AUTO: 0.6 10E3/UL (ref 0–1.3)
MONOCYTES NFR BLD AUTO: 7 %
NEUTROPHILS # BLD AUTO: 5 10E3/UL (ref 1.6–8.3)
NEUTROPHILS NFR BLD AUTO: 64 %
NRBC # BLD AUTO: 0 10E3/UL
NRBC BLD AUTO-RTO: 0 /100
P AXIS - MUSE: 53 DEGREES
P AXIS - MUSE: NORMAL DEGREES
PLATELET # BLD AUTO: 185 10E3/UL (ref 150–450)
POTASSIUM BLD-SCNC: 4 MMOL/L (ref 3.5–5.1)
PR INTERVAL - MUSE: 136 MS
PR INTERVAL - MUSE: NORMAL MS
PROT SERPL-MCNC: 7.3 G/DL (ref 6.4–8.9)
QRS DURATION - MUSE: 82 MS
QRS DURATION - MUSE: 90 MS
QT - MUSE: 244 MS
QT - MUSE: 388 MS
QTC - MUSE: 393 MS
QTC - MUSE: 438 MS
R AXIS - MUSE: 47 DEGREES
R AXIS - MUSE: 70 DEGREES
RBC # BLD AUTO: 5.64 10E6/UL (ref 4.4–5.9)
SODIUM SERPL-SCNC: 138 MMOL/L (ref 134–144)
SYSTOLIC BLOOD PRESSURE - MUSE: NORMAL MMHG
SYSTOLIC BLOOD PRESSURE - MUSE: NORMAL MMHG
T AXIS - MUSE: -74 DEGREES
T AXIS - MUSE: 24 DEGREES
TROPONIN I SERPL-MCNC: 7.4 PG/ML (ref 0–34)
VENTRICULAR RATE- MUSE: 194 BPM
VENTRICULAR RATE- MUSE: 62 BPM
WBC # BLD AUTO: 7.8 10E3/UL (ref 4–11)

## 2022-10-03 PROCEDURE — 250N000011 HC RX IP 250 OP 636: Performed by: EMERGENCY MEDICINE

## 2022-10-03 PROCEDURE — 82040 ASSAY OF SERUM ALBUMIN: CPT | Performed by: EMERGENCY MEDICINE

## 2022-10-03 PROCEDURE — 96375 TX/PRO/DX INJ NEW DRUG ADDON: CPT | Performed by: EMERGENCY MEDICINE

## 2022-10-03 PROCEDURE — 36415 COLL VENOUS BLD VENIPUNCTURE: CPT | Performed by: EMERGENCY MEDICINE

## 2022-10-03 PROCEDURE — 96376 TX/PRO/DX INJ SAME DRUG ADON: CPT | Performed by: EMERGENCY MEDICINE

## 2022-10-03 PROCEDURE — 84484 ASSAY OF TROPONIN QUANT: CPT | Performed by: EMERGENCY MEDICINE

## 2022-10-03 PROCEDURE — 99284 EMERGENCY DEPT VISIT MOD MDM: CPT | Performed by: EMERGENCY MEDICINE

## 2022-10-03 PROCEDURE — 93005 ELECTROCARDIOGRAM TRACING: CPT | Performed by: EMERGENCY MEDICINE

## 2022-10-03 PROCEDURE — 96374 THER/PROPH/DIAG INJ IV PUSH: CPT | Performed by: EMERGENCY MEDICINE

## 2022-10-03 PROCEDURE — 93010 ELECTROCARDIOGRAM REPORT: CPT | Performed by: INTERNAL MEDICINE

## 2022-10-03 PROCEDURE — 93005 ELECTROCARDIOGRAM TRACING: CPT | Mod: 76 | Performed by: EMERGENCY MEDICINE

## 2022-10-03 PROCEDURE — 99285 EMERGENCY DEPT VISIT HI MDM: CPT | Mod: 25 | Performed by: EMERGENCY MEDICINE

## 2022-10-03 PROCEDURE — 71045 X-RAY EXAM CHEST 1 VIEW: CPT

## 2022-10-03 PROCEDURE — 80053 COMPREHEN METABOLIC PANEL: CPT | Performed by: EMERGENCY MEDICINE

## 2022-10-03 PROCEDURE — 85025 COMPLETE CBC W/AUTO DIFF WBC: CPT | Performed by: EMERGENCY MEDICINE

## 2022-10-03 RX ORDER — DILTIAZEM HYDROCHLORIDE 5 MG/ML
20 INJECTION INTRAVENOUS ONCE
Status: COMPLETED | OUTPATIENT
Start: 2022-10-03 | End: 2022-10-03

## 2022-10-03 RX ORDER — DILTIAZEM HYDROCHLORIDE 5 MG/ML
25 INJECTION INTRAVENOUS ONCE
Status: COMPLETED | OUTPATIENT
Start: 2022-10-03 | End: 2022-10-03

## 2022-10-03 RX ORDER — ADENOSINE 3 MG/ML
6 INJECTION, SOLUTION INTRAVENOUS ONCE
Status: COMPLETED | OUTPATIENT
Start: 2022-10-03 | End: 2022-10-03

## 2022-10-03 RX ORDER — ADENOSINE 3 MG/ML
12 INJECTION, SOLUTION INTRAVENOUS ONCE
Status: COMPLETED | OUTPATIENT
Start: 2022-10-03 | End: 2022-10-03

## 2022-10-03 RX ADMIN — DILTIAZEM HYDROCHLORIDE 25 MG: 5 INJECTION INTRAVENOUS at 10:56

## 2022-10-03 RX ADMIN — ADENOSINE 6 MG: 3 INJECTION, SOLUTION INTRAVENOUS at 09:24

## 2022-10-03 RX ADMIN — ADENOSINE 12 MG: 3 INJECTION, SOLUTION INTRAVENOUS at 09:28

## 2022-10-03 RX ADMIN — DILTIAZEM HYDROCHLORIDE 20 MG: 5 INJECTION INTRAVENOUS at 09:33

## 2022-10-03 ASSESSMENT — ENCOUNTER SYMPTOMS
VOMITING: 0
CHEST TIGHTNESS: 0
NAUSEA: 0
LIGHT-HEADEDNESS: 1
DYSURIA: 0
CHILLS: 0
AGITATION: 0
FEVER: 0
SHORTNESS OF BREATH: 0
ARTHRALGIAS: 0
PALPITATIONS: 1

## 2022-10-03 ASSESSMENT — ACTIVITIES OF DAILY LIVING (ADL): ADLS_ACUITY_SCORE: 35

## 2022-10-03 NOTE — DISCHARGE INSTRUCTIONS
Follow-up in clinic with your regular doctor for yearly exam and to let them know what happened here today.  Return if worse.

## 2022-10-03 NOTE — ED PROVIDER NOTES
History     Chief Complaint   Patient presents with     Palpitations     Dizziness     HPI  Moses Navarro is a 52 year old male who reports that this morning he noticed a fluttering feeling in his chest.  He felt perhaps a little bit lightheaded but has no other symptoms.  He does have a history of atrial fibrillation and this feels somewhat similar to that.  He states he has been working a lot and has been feeling exhausted lately.  He has been drinking normal amount of caffeine but does have a caffeine drink in the morning.  Denies alcohol use.  Has not been ill recently.  He said when he went to bed last night he was feeling perfectly fine.  He is not sure if this may have started sometime in the middle of the night or if it started after he woke up.    Allergies:  No Known Allergies    Problem List:    Patient Active Problem List    Diagnosis Date Noted     Hematuria 02/03/2017     Priority: Medium     Other dyspnea and respiratory abnormality 05/07/2012     Priority: Medium     Insomnia, persistent 10/19/2011     Priority: Medium     Crush injury of hand 03/08/2011     Priority: Medium     Atrial flutter (H) 01/25/2011     Priority: Medium        Past Medical History:    Past Medical History:   Diagnosis Date     Atrial fibrillation and flutter      Injury      Injury      Injury of tendon of rotator cuff      Other injury of spleen, initial encounter        Past Surgical History:    Past Surgical History:   Procedure Laterality Date     ARTHROSCOPY ANKLE, OPEN REPAIR LIGAMENT, COMBINED Right 9/3/2020    Procedure: ARTHROSCOPY, ANKLE, WITH OPEN REPAIR OF ANKLE LIGAMENT;  Surgeon: Ignacio Black DPM;  Location: [facility] OR     ARTHROSCOPY KNEE      Right knee     EP ABLATION FOCAL AFIB  09/29/2011    [facility]     FOOT SURGERY      foot and toe fractures due to accident     OTHER SURGICAL HISTORY      angiogram right hand       Family History:    Family History   Problem Relation Age of Onset     Diabetes Father   "       Diabetes,type II     Hypertension Father         Hypertension     Family History Negative Mother         Good Health     Family History Negative Sister         Good Health     Heart Disease Other      Diabetes Paternal Uncle      Prostate Cancer No family hx of      Colon Cancer No family hx of        Social History:  Marital Status:   [2]  Social History     Tobacco Use     Smoking status: Never Smoker     Smokeless tobacco: Never Used   Substance Use Topics     Alcohol use: Yes     Alcohol/week: 6.0 standard drinks     Drug use: Never        Medications:    No current outpatient medications on file.        Review of Systems   Constitutional: Negative for chills and fever.   HENT: Negative for congestion.    Eyes: Negative for visual disturbance.   Respiratory: Negative for chest tightness and shortness of breath.    Cardiovascular: Positive for palpitations. Negative for chest pain.   Gastrointestinal: Negative for nausea and vomiting.   Genitourinary: Negative for dysuria.   Musculoskeletal: Negative for arthralgias.   Skin: Negative for rash.   Neurological: Positive for light-headedness.   Psychiatric/Behavioral: Negative for agitation.       Physical Exam   BP: (!) 126/94  Pulse: (!) 190  Temp: 98.5  F (36.9  C)  Resp: 16  Height: 180.3 cm (5' 11\")  Weight: 97.5 kg (215 lb)  SpO2: 98 %      Physical Exam  Vitals and nursing note reviewed.   Constitutional:       Appearance: Normal appearance.   HENT:      Head: Normocephalic and atraumatic.      Mouth/Throat:      Mouth: Mucous membranes are moist.   Eyes:      Conjunctiva/sclera: Conjunctivae normal.   Cardiovascular:      Rate and Rhythm: Tachycardia present. Rhythm irregular.      Heart sounds: Normal heart sounds.   Pulmonary:      Effort: Pulmonary effort is normal.      Breath sounds: Normal breath sounds.   Abdominal:      General: Abdomen is flat. Bowel sounds are normal.   Skin:     General: Skin is warm and dry.   Neurological:      " Mental Status: He is alert and oriented to person, place, and time.   Psychiatric:         Behavior: Behavior normal.         ED Course              ED Course as of 10/03/22 1221   Mon Oct 03, 2022   0936 Initial EKG showed supraventricular tachycardia at 194 bpm.  Patient was given 6 mg of IV adenosine with no effect.  He was then given 12 mg dose.  He did slow down, however when his rate picked back up again it was still quite rapid but only at 150 bpm.  Subsequent EKG shows an atrial flutter with a variable AV block.  We are checking some labs.  We will try some IV Cardizem.     Procedures         Initial EKG shows what looks like a supraventricular tachycardia at 194 bpm.    Repeat EKG after receiving IV adenosine shows atrial flutter with variable AV block at a rate of 153 bpm.  There does appear to be some ST segment depression laterally.    Repeat EKG after patient spontaneously converted after 2 doses of IV diltiazem shows normal sinus rhythm at 62 bpm.  In this EKG the CT interval does appear variable.  He is observed for period of time and on the heart monitor this appears to resolve and his CT intervals all appear normal.       Results for orders placed or performed during the hospital encounter of 10/03/22 (from the past 24 hour(s))   EKG 12-lead, tracing only   Result Value Ref Range    Systolic Blood Pressure  mmHg    Diastolic Blood Pressure  mmHg    Ventricular Rate 194 BPM    Atrial Rate 192 BPM    CT Interval  ms    QRS Duration 82 ms     ms    QTc 438 ms    P Axis  degrees    R AXIS 70 degrees    T Axis -74 degrees    Interpretation ECG       Supraventricular tachycardia  ST & T wave abnormality, consider inferior ischemia  Abnormal ECG  When compared with ECG of 12-AUG-2020 15:49,  Vent. rate has increased  BPM  Non-specific change in ST segment in Inferior leads  ST now depressed in Anterolateral leads  T wave inversion now evident in Inferior leads  Nonspecific T wave abnormality  now evident in Lateral leads     CBC with platelets differential    Narrative    The following orders were created for panel order CBC with platelets differential.  Procedure                               Abnormality         Status                     ---------                               -----------         ------                     CBC with platelets and d...[[id number]]                      Final result                 Please view results for these tests on the individual orders.   Troponin I   Result Value Ref Range    Troponin I 7.4 0.0 - 34.0 pg/mL   Comprehensive metabolic panel   Result Value Ref Range    Sodium 138 134 - 144 mmol/L    Potassium 4.0 3.5 - 5.1 mmol/L    Chloride 103 98 - 107 mmol/L    Carbon Dioxide (CO2) 25 21 - 31 mmol/L    Anion Gap 10 3 - 14 mmol/L    Urea Nitrogen 17 7 - 25 mg/dL    Creatinine 1.03 0.70 - 1.30 mg/dL    Calcium 9.6 8.6 - 10.3 mg/dL    Glucose 110 (H) 70 - 105 mg/dL    Alkaline Phosphatase 42 34 - 104 U/L    AST 22 13 - 39 U/L    ALT 30 7 - 52 U/L    Protein Total 7.3 6.4 - 8.9 g/dL    Albumin 4.3 3.5 - 5.7 g/dL    Bilirubin Total 1.3 (H) 0.3 - 1.0 mg/dL    GFR Estimate 88 >60 mL/min/1.73m2   CBC with platelets and differential   Result Value Ref Range    WBC Count 7.8 4.0 - 11.0 10e3/uL    RBC Count 5.64 4.40 - 5.90 10e6/uL    Hemoglobin 17.4 13.3 - 17.7 g/dL    Hematocrit 49.3 40.0 - 53.0 %    MCV 87 78 - 100 fL    MCH 30.9 26.5 - 33.0 pg    MCHC 35.3 31.5 - 36.5 g/dL    RDW 12.3 10.0 - 15.0 %    Platelet Count 185 150 - 450 10e3/uL    % Neutrophils 64 %    % Lymphocytes 27 %    % Monocytes 7 %    % Eosinophils 1 %    % Basophils 1 %    % Immature Granulocytes 0 %    NRBCs per 100 WBC 0 <1 /100    Absolute Neutrophils 5.0 1.6 - 8.3 10e3/uL    Absolute Lymphocytes 2.1 0.8 - 5.3 10e3/uL    Absolute Monocytes 0.6 0.0 - 1.3 10e3/uL    Absolute Eosinophils 0.1 0.0 - 0.7 10e3/uL    Absolute Basophils 0.1 0.0 - 0.2 10e3/uL    Absolute Immature Granulocytes 0.0 <=0.4 10e3/uL     Absolute NRBCs 0.0 10e3/uL   Extra Tube    Narrative    The following orders were created for panel order Extra Tube.  Procedure                               Abnormality         Status                     ---------                               -----------         ------                     Extra Blue Top Tube[[id number]]                              Final result               Extra Serum Separator Tu...[[id number]]                      Final result                 Please view results for these tests on the individual orders.   Extra Blue Top Tube   Result Value Ref Range    Hold Specimen JIC    Extra Serum Separator Tube (SST)   Result Value Ref Range    Hold Specimen JIC    XR Chest Port 1 View    Narrative    PROCEDURE:  XR CHEST PORT 1 VIEW    HISTORY: svt. .    COMPARISON:  None.    FINDINGS:    The cardiomediastinal contours are stable.  No focal consolidation, effusion or pneumothorax.      Impression    IMPRESSION:  Stable chest.      RUSS POWER MD         SYSTEM ID:  [id number]   EKG 12-lead, tracing only   Result Value Ref Range    Systolic Blood Pressure  mmHg    Diastolic Blood Pressure  mmHg    Ventricular Rate 62 BPM    Atrial Rate 63 BPM    OK Interval 136 ms    QRS Duration 90 ms     ms    QTc 393 ms    P Axis 53 degrees    R AXIS 47 degrees    T Axis 24 degrees    Interpretation ECG       Sinus rhythm  Normal ECG  When compared with ECG of 03-OCT-2022 09:28, (unconfirmed)  Sinus rhythm has replaced Atrial flutter  Vent. rate has decreased BY  91 BPM  ST no longer depressed in Inferior leads  ST no longer depressed in Anterior leads  T wave inversion no longer evident in Inferior leads  T wave inversion no longer evident in Lateral leads         Medications   adenosine (ADENOCARD) injection 6 mg (6 mg Intravenous Given 10/3/22 0924)   diltiazem (CARDIZEM) injection 20 mg (20 mg Intravenous Given 10/3/22 0933)   adenosine (ADENOCARD) injection 12 mg (12 mg Intravenous Given 10/3/22  0928)   diltiazem (CARDIZEM) injection 25 mg (25 mg Intravenous Given 10/3/22 1409)       Assessments & Plan (with Medical Decision Making)     I have reviewed the nursing notes.    I have reviewed the findings, diagnosis, plan and need for follow up with the patient.  Patient with what initially appeared to be SVT.  This then converted to atrial flutter after adenosine.  He was then given 2 doses of IV Cardizem and as we were discussing whether or not we wanted to try electrical cardioversion, he spontaneously converted back to sinus rhythm.  Initially his AK intervals seemed somewhat variable, however with time this appears to have resolved and he has normal sinus rhythm at this time.  He has been very minimally symptomatic through all of this and feels perfectly fine at this time.  His chads score is 0.  I did suggest perhaps a daily aspirin but no other anticoagulation.  I recommended following up in clinic for routine physical and to discuss this with his primary provider.  Return if worse.    New Prescriptions    No medications on file       Final diagnoses:   Paroxysmal atrial fibrillation (H)       10/3/2022   [facility]     Bruce Lazaro MD  10/03/22 9618

## 2022-10-03 NOTE — PROGRESS NOTES
20 mg of cardizem administered. Lowest heart rate 109 after administration- heart rate currently 120's with a-fib appearance. Patient continues to feel asymptomatic.

## 2022-10-03 NOTE — PROGRESS NOTES
6 mg of adenosine administered with no effect. Administered 12 mg of adenosine with brief decrease in heart rate- rate back to 156

## 2022-10-03 NOTE — PROGRESS NOTES
Patient presents to the ER with complaints of an irregular heart beat and dizziness that started this morning. He denies chest pain or shortness of breath. He states he has felt well up until this morning. Denies nausea. He had a cardiac ablation about 10 years ago for a-fib but has been asymptomatic since.

## 2022-10-04 LAB
ATRIAL RATE - MUSE: 303 BPM
DIASTOLIC BLOOD PRESSURE - MUSE: NORMAL MMHG
INTERPRETATION ECG - MUSE: NORMAL
P AXIS - MUSE: NORMAL DEGREES
PR INTERVAL - MUSE: NORMAL MS
QRS DURATION - MUSE: 78 MS
QT - MUSE: 324 MS
QTC - MUSE: 517 MS
R AXIS - MUSE: 68 DEGREES
SYSTOLIC BLOOD PRESSURE - MUSE: NORMAL MMHG
T AXIS - MUSE: -59 DEGREES
VENTRICULAR RATE- MUSE: 153 BPM

## 2023-02-01 ENCOUNTER — NURSE TRIAGE (OUTPATIENT)
Dept: FAMILY MEDICINE | Facility: OTHER | Age: 53
End: 2023-02-01
Payer: COMMERCIAL

## 2023-02-01 ENCOUNTER — MYC MEDICAL ADVICE (OUTPATIENT)
Dept: INTERNAL MEDICINE | Facility: OTHER | Age: 53
End: 2023-02-01
Payer: COMMERCIAL

## 2023-02-01 NOTE — TELEPHONE ENCOUNTER
Called and left message for patient to call back. See triage encounter.     Adeline Cardenas, RN on 2/1/2023 at 4:04 PM

## 2023-02-01 NOTE — TELEPHONE ENCOUNTER
Call and left message for patient to call back. See [employer] message.     Adeline Cardenas RN on 2/1/2023 at 4:01 PM

## 2023-02-02 ENCOUNTER — OFFICE VISIT (OUTPATIENT)
Dept: FAMILY MEDICINE | Facility: OTHER | Age: 53
End: 2023-02-02
Attending: FAMILY MEDICINE
Payer: COMMERCIAL

## 2023-02-02 VITALS
HEIGHT: 72 IN | TEMPERATURE: 98.1 F | BODY MASS INDEX: 29.47 KG/M2 | RESPIRATION RATE: 20 BRPM | SYSTOLIC BLOOD PRESSURE: 130 MMHG | HEART RATE: 102 BPM | OXYGEN SATURATION: 98 % | DIASTOLIC BLOOD PRESSURE: 88 MMHG | WEIGHT: 217.6 LBS

## 2023-02-02 DIAGNOSIS — I48.0 PAROXYSMAL ATRIAL FIBRILLATION WITH RVR (H): Primary | ICD-10-CM

## 2023-02-02 LAB
ALBUMIN SERPL BCG-MCNC: 4.6 G/DL (ref 3.5–5.2)
ALP SERPL-CCNC: 51 U/L (ref 40–129)
ALT SERPL W P-5'-P-CCNC: 53 U/L (ref 10–50)
ANION GAP SERPL CALCULATED.3IONS-SCNC: 9 MMOL/L (ref 7–15)
AST SERPL W P-5'-P-CCNC: 35 U/L (ref 10–50)
BILIRUB SERPL-MCNC: 1.3 MG/DL
BUN SERPL-MCNC: 18.7 MG/DL (ref 6–20)
CALCIUM SERPL-MCNC: 9.3 MG/DL (ref 8.6–10)
CHLORIDE SERPL-SCNC: 99 MMOL/L (ref 98–107)
CREAT SERPL-MCNC: 1.08 MG/DL (ref 0.67–1.17)
DEPRECATED HCO3 PLAS-SCNC: 29 MMOL/L (ref 22–29)
ERYTHROCYTE [DISTWIDTH] IN BLOOD BY AUTOMATED COUNT: 12.6 % (ref 10–15)
GFR SERPL CREATININE-BSD FRML MDRD: 83 ML/MIN/1.73M2
GLUCOSE SERPL-MCNC: 104 MG/DL (ref 70–99)
HCT VFR BLD AUTO: 47.8 % (ref 40–53)
HGB BLD-MCNC: 16.7 G/DL (ref 13.3–17.7)
MAGNESIUM SERPL-MCNC: 2.1 MG/DL (ref 1.7–2.3)
MCH RBC QN AUTO: 30.9 PG (ref 26.5–33)
MCHC RBC AUTO-ENTMCNC: 34.9 G/DL (ref 31.5–36.5)
MCV RBC AUTO: 89 FL (ref 78–100)
PLATELET # BLD AUTO: 211 10E3/UL (ref 150–450)
POTASSIUM SERPL-SCNC: 4.2 MMOL/L (ref 3.4–5.3)
PROT SERPL-MCNC: 7.6 G/DL (ref 6.4–8.3)
RBC # BLD AUTO: 5.4 10E6/UL (ref 4.4–5.9)
SODIUM SERPL-SCNC: 137 MMOL/L (ref 136–145)
TSH SERPL DL<=0.005 MIU/L-ACNC: 2.18 UIU/ML (ref 0.3–4.2)
WBC # BLD AUTO: 9.6 10E3/UL (ref 4–11)

## 2023-02-02 PROCEDURE — 83735 ASSAY OF MAGNESIUM: CPT | Mod: ZL | Performed by: FAMILY MEDICINE

## 2023-02-02 PROCEDURE — 99214 OFFICE O/P EST MOD 30 MIN: CPT | Performed by: FAMILY MEDICINE

## 2023-02-02 PROCEDURE — 93000 ELECTROCARDIOGRAM COMPLETE: CPT | Performed by: INTERNAL MEDICINE

## 2023-02-02 PROCEDURE — 84443 ASSAY THYROID STIM HORMONE: CPT | Mod: ZL | Performed by: FAMILY MEDICINE

## 2023-02-02 PROCEDURE — 36415 COLL VENOUS BLD VENIPUNCTURE: CPT | Mod: ZL | Performed by: FAMILY MEDICINE

## 2023-02-02 PROCEDURE — 85027 COMPLETE CBC AUTOMATED: CPT | Mod: ZL | Performed by: FAMILY MEDICINE

## 2023-02-02 PROCEDURE — 80053 COMPREHEN METABOLIC PANEL: CPT | Mod: ZL | Performed by: FAMILY MEDICINE

## 2023-02-02 RX ORDER — DILTIAZEM HCL 60 MG
TABLET ORAL
Qty: 30 TABLET | Refills: 0 | Status: SHIPPED | OUTPATIENT
Start: 2023-02-02

## 2023-02-02 ASSESSMENT — PAIN SCALES - GENERAL: PAINLEVEL: NO PAIN (0)

## 2023-02-02 NOTE — TELEPHONE ENCOUNTER
Left second message for patient to return call to speak with a triage nurse. Maryana Hernandez RN on 2/2/2023 at 10:43 AM

## 2023-02-02 NOTE — PROGRESS NOTES
"  Assessment & Plan     (I48.0) Paroxysmal atrial fibrillation with RVR (H)  (primary encounter diagnosis)  Comment: he is very tolerant of lingering tachycardia.  A HR over about 140 or so for more than 60 minutes should trigger an emergency department visit.  Will give him diltiazem to have on hand for as needed use, and not to use more than 4 doses in 4 hours without an EMERGENCY DEPARTMENT visit.  He understands.  Will arrange cardiology consult.  CHADS2 score is zero, no indication for anticoagulation.    Plan: diltiazem (CARDIZEM) 60 MG tablet, EKG 12-lead         complete w/read - Clinics, Adult Cardiology         Eval  Referral                        BMI:   Estimated body mass index is 29.93 kg/m  as calculated from the following:    Height as of this encounter: 1.816 m (5' 11.5\").    Weight as of this encounter: 98.7 kg (217 lb 9.6 oz).           No follow-ups on file.    Tommy Bruner MD  [facility]    Liza Allison is a 52 year old, presenting for the following health issues:  Palpitations      Palpitations    History of Present Illness       Reason for visit:  Heart racing    He eats 2-3 servings of fruits and vegetables daily.He consumes 0 sweetened beverage(s) daily.He exercises with enough effort to increase his heart rate 9 or less minutes per day.  He exercises with enough effort to increase his heart rate 3 or less days per week.   He is taking medications regularly.     Last spell was in September or so.  Then had a recurrence last weekend while laying flat. Prior ablation for psvt.  Feels it is back now.  [employer] fan was at work, was seen in the emergency department for this.  I reviewed that note.  He got adenosine there, and then IV diltiazem.  The most recent spell lasted for about 12 hours.  He says his HR was up to the 180's.              Review of Systems         Objective    /88   Pulse 102   Temp 98.1  F (36.7  C) (Tympanic)   Resp 20   Ht 1.816 " "m (5' 11.5\")   Wt 98.7 kg (217 lb 9.6 oz)   SpO2 98%   BMI 29.93 kg/m    Body mass index is 29.93 kg/m .  Physical Exam  Constitutional:       Appearance: Normal appearance.   Cardiovascular:      Rate and Rhythm: Normal rate and regular rhythm.      Heart sounds: No murmur heard.    No friction rub. No gallop.   Pulmonary:      Effort: Pulmonary effort is normal. No respiratory distress.      Breath sounds: No stridor.   Neurological:      General: No focal deficit present.      Mental Status: He is alert and oriented to person, place, and time.   Psychiatric:         Mood and Affect: Mood normal.         Behavior: Behavior normal.            Results for orders placed or performed in visit on 02/02/23 (from the past 24 hour(s))   EKG 12-lead complete w/read - Clinics   Result Value Ref Range    Systolic Blood Pressure  mmHg    Diastolic Blood Pressure  mmHg    Ventricular Rate 75 BPM    Atrial Rate 75 BPM    DC Interval 138 ms    QRS Duration 84 ms     ms    QTc 408 ms    P Axis 55 degrees    R AXIS 60 degrees    T Axis 31 degrees    Interpretation ECG       Sinus rhythm  Normal ECG  When compared with ECG of 03-OCT-2022 11:23,  No significant change was found     Magnesium   Result Value Ref Range    Magnesium 2.1 1.7 - 2.3 mg/dL   Comprehensive Metabolic Panel   Result Value Ref Range    Sodium 137 136 - 145 mmol/L    Potassium 4.2 3.4 - 5.3 mmol/L    Chloride 99 98 - 107 mmol/L    Carbon Dioxide (CO2) 29 22 - 29 mmol/L    Anion Gap 9 7 - 15 mmol/L    Urea Nitrogen 18.7 6.0 - 20.0 mg/dL    Creatinine 1.08 0.67 - 1.17 mg/dL    Calcium 9.3 8.6 - 10.0 mg/dL    Glucose 104 (H) 70 - 99 mg/dL    Alkaline Phosphatase 51 40 - 129 U/L    AST 35 10 - 50 U/L    ALT 53 (H) 10 - 50 U/L    Protein Total 7.6 6.4 - 8.3 g/dL    Albumin 4.6 3.5 - 5.2 g/dL    Bilirubin Total 1.3 (H) <=1.2 mg/dL    GFR Estimate 83 >60 mL/min/1.73m2   TSH   Result Value Ref Range    TSH 2.18 0.30 - 4.20 uIU/mL   CBC W PLT No Diff   Result " Value Ref Range    WBC Count 9.6 4.0 - 11.0 10e3/uL    RBC Count 5.40 4.40 - 5.90 10e6/uL    Hemoglobin 16.7 13.3 - 17.7 g/dL    Hematocrit 47.8 40.0 - 53.0 %    MCV 89 78 - 100 fL    MCH 30.9 26.5 - 33.0 pg    MCHC 34.9 31.5 - 36.5 g/dL    RDW 12.6 10.0 - 15.0 %    Platelet Count 211 150 - 450 10e3/uL

## 2023-02-02 NOTE — NURSING NOTE
"Chief Complaint   Patient presents with     Palpitations     Heart ablation 10+ years ago. He has recently had racing heart ,dizziness and went to the ER in Sept. Saturday he recently had racing heart again.    Initial /88   Pulse 102   Temp 98.1  F (36.7  C) (Tympanic)   Resp 20   Ht 1.816 m (5' 11.5\")   Wt 98.7 kg (217 lb 9.6 oz)   SpO2 98%   BMI 29.93 kg/m   Estimated body mass index is 29.93 kg/m  as calculated from the following:    Height as of this encounter: 1.816 m (5' 11.5\").    Weight as of this encounter: 98.7 kg (217 lb 9.6 oz).         Norma J. Gosselin, LPN   "

## 2023-02-03 NOTE — TELEPHONE ENCOUNTER
Noted that patient was seen in office yesterday for paroxysmal atrial fibrillation with RVR. Closing this encounter due to be seen on 02/02/23.     Adeline Cardenas RN on 2/3/2023 at 8:09 AM

## 2023-02-05 LAB
ATRIAL RATE - MUSE: 75 BPM
DIASTOLIC BLOOD PRESSURE - MUSE: NORMAL MMHG
INTERPRETATION ECG - MUSE: NORMAL
P AXIS - MUSE: 55 DEGREES
PR INTERVAL - MUSE: 138 MS
QRS DURATION - MUSE: 84 MS
QT - MUSE: 366 MS
QTC - MUSE: 408 MS
R AXIS - MUSE: 60 DEGREES
SYSTOLIC BLOOD PRESSURE - MUSE: NORMAL MMHG
T AXIS - MUSE: 31 DEGREES
VENTRICULAR RATE- MUSE: 75 BPM

## 2023-03-01 ENCOUNTER — OFFICE VISIT (OUTPATIENT)
Dept: CARDIOLOGY | Facility: OTHER | Age: 53
End: 2023-03-01
Attending: NURSE PRACTITIONER
Payer: COMMERCIAL

## 2023-03-01 ENCOUNTER — HOSPITAL ENCOUNTER (OUTPATIENT)
Dept: GENERAL RADIOLOGY | Facility: OTHER | Age: 53
Discharge: HOME | End: 2023-03-01
Attending: INTERNAL MEDICINE
Payer: COMMERCIAL

## 2023-03-01 ENCOUNTER — OFFICE VISIT (OUTPATIENT)
Dept: PEDIATRICS | Facility: OTHER | Age: 53
End: 2023-03-01
Attending: INTERNAL MEDICINE
Payer: COMMERCIAL

## 2023-03-01 VITALS
RESPIRATION RATE: 20 BRPM | HEART RATE: 113 BPM | SYSTOLIC BLOOD PRESSURE: 118 MMHG | TEMPERATURE: 97.3 F | BODY MASS INDEX: 29.95 KG/M2 | DIASTOLIC BLOOD PRESSURE: 82 MMHG | WEIGHT: 217.8 LBS | OXYGEN SATURATION: 97 %

## 2023-03-01 VITALS
WEIGHT: 214 LBS | RESPIRATION RATE: 20 BRPM | DIASTOLIC BLOOD PRESSURE: 82 MMHG | OXYGEN SATURATION: 97 % | SYSTOLIC BLOOD PRESSURE: 118 MMHG | TEMPERATURE: 97.3 F | BODY MASS INDEX: 29.43 KG/M2 | HEART RATE: 100 BPM

## 2023-03-01 DIAGNOSIS — I48.0 PAROXYSMAL ATRIAL FIBRILLATION WITH RVR (H): Primary | ICD-10-CM

## 2023-03-01 DIAGNOSIS — M25.532 LEFT WRIST PAIN: ICD-10-CM

## 2023-03-01 DIAGNOSIS — Z98.890 STATUS POST CATHETER ABLATION OF ATRIAL FIBRILLATION: ICD-10-CM

## 2023-03-01 DIAGNOSIS — M25.532 LEFT WRIST PAIN: Primary | ICD-10-CM

## 2023-03-01 DIAGNOSIS — R00.2 PALPITATIONS: ICD-10-CM

## 2023-03-01 PROCEDURE — 99205 OFFICE O/P NEW HI 60 MIN: CPT | Performed by: NURSE PRACTITIONER

## 2023-03-01 PROCEDURE — 73110 X-RAY EXAM OF WRIST: CPT | Mod: LT

## 2023-03-01 PROCEDURE — 93000 ELECTROCARDIOGRAM COMPLETE: CPT | Performed by: INTERNAL MEDICINE

## 2023-03-01 PROCEDURE — 99213 OFFICE O/P EST LOW 20 MIN: CPT | Performed by: INTERNAL MEDICINE

## 2023-03-01 ASSESSMENT — PAIN SCALES - GENERAL
PAINLEVEL: MODERATE PAIN (4)
PAINLEVEL: MODERATE PAIN (4)

## 2023-03-01 NOTE — NURSING NOTE
"Chief Complaint   Patient presents with     Musculoskeletal Problem     Left wrist pain/numbness         Initial /82   Pulse 113   Temp 97.3  F (36.3  C) (Tympanic)   Resp 20   Wt 98.8 kg (217 lb 12.8 oz)   SpO2 97%   BMI 29.95 kg/m   Estimated body mass index is 29.95 kg/m  as calculated from the following:    Height as of 2/2/23: 1.816 m (5' 11.5\").    Weight as of this encounter: 98.8 kg (217 lb 12.8 oz).         Norma J. Gosselin, LPN   "

## 2023-03-01 NOTE — PATIENT INSTRUCTIONS
You will receive a phone call to schedule echocardiogram and ZIO cardiac monitor.   You may take Diltiazem half tab (30 mg) if needed for racing palpitations, you may take this up to 4 times in a day if needed with close monitoring of your blood pressure.   Follow-up with cardiology in 6 weeks to review above testing.   Start Aspirin 325 mg daily.

## 2023-03-01 NOTE — NURSING NOTE
"Chief Complaint   Patient presents with     Consult     PAF       Initial /82 (BP Location: Right arm, Patient Position: Sitting, Cuff Size: Adult Large)   Pulse 100   Temp 97.3  F (36.3  C) (Tympanic)   Resp 20   Wt 97.1 kg (214 lb)   SpO2 97%   BMI 29.43 kg/m   Estimated body mass index is 29.43 kg/m  as calculated from the following:    Height as of 2/2/23: 1.816 m (5' 11.5\").    Weight as of this encounter: 97.1 kg (214 lb).  Meds Reconciled: complete  Pt is not on Aspirin  Pt is not on a Statin  PHQ and/or SHAY reviewed. Pt referred to PCP/MH Provider as appropriate.    Sheila Doyle LPN      "

## 2023-03-01 NOTE — PATIENT INSTRUCTIONS
-- Voltaren gel/diclofenac   -- Ibuprofen 600 mg 3x/day for a week then as needed   -- Tylenol 1000 mg 3x/day scheduled   -- Rest   -- Ice   -- Elevate   -- PT consult   -- Low threshold for Ortho vs Sports med consults

## 2023-03-01 NOTE — PROGRESS NOTES
"  Assessment & Plan     (M25.532) Left wrist pain  (primary encounter diagnosis)  Comment: Discomfort and tingling to anterior and posterior wrist and thenar of left hand with use, none at rest. There is no evidence of acute injury or fracture. This is likely arthritic changes to left wrist/thumb, other differentials include de Quervain tendinopathy, carpal tunnel syndrome, wrist sprain, triangular fibrocartilage injury, others.     Plan: XR Wrist Left G/E 3 Views, Physical Therapy         Referral  Discussed the risks and benefits of imaging hand. Results show degenerative arthritic changes of the scaphotrapezium, scaphotrapezoid and trapezium first metacarpal articulations. Rest, elevation, ibuprofen, tylenol, topical Voltaren gel. Avoid activities that provoke pain. PT referral placed.      BMI:   Estimated body mass index is 29.95 kg/m  as calculated from the following:    Height as of 2/2/23: 1.816 m (5' 11.5\").    Weight as of this encounter: 98.8 kg (217 lb 12.8 oz).   Weight management plan: Discussed healthy diet and exercise guidelines      Return if symptoms worsen or fail to improve.    Cassandra FERRER NP student.     Isaac Alonzo MD  [facility]    Attestation Statement:  I was present with the medical student who participated in the service and in the documentation of this note. I have verified the history and personally performed the physical exam and medical decision making, and have verified the content of the note which accurately reflects my assessment of the patient and the plan of care.    Signed, Isaac Alonzo MD, FAAP, FACP  Internal Medicine & Pediatrics  3/1/2023 10:11 AM      Liza Allison is a 52 year old, presenting for the following health issues:  Musculoskeletal Problem (Left wrist pain/numbness)      Musculoskeletal Problem    History of Present Illness       Reason for visit:  Pain in wrist,  numbing  Symptom onset:  More than a month  Symptoms include:  " Pain in wrist,  numbing  Symptom intensity:  Moderate  Symptom progression:  Worsening  Had these symptoms before:  No  What makes it worse:  Using it  What makes it better:  Rest    He eats 0-1 servings of fruits and vegetables daily.He consumes 0 sweetened beverage(s) daily.He exercises with enough effort to increase his heart rate 9 or less minutes per day.  He exercises with enough effort to increase his heart rate 3 or less days per week.   He is taking medications regularly.     Pain for the past two years, increasingly worse in the past month. No acute trauma. Occasional click feeling in the wrist with use causing stabbing pain for about 10 minutes then goes away, also has numbness to the medial wrist and thumb metacarpus, carpometacarpal joint and trapezius. No numbness or pain to fingers, up arm, none to elbow, shoulder or neck. He is right handed. 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 Most recently rode last night. When he holds his wrist still there is less pain.   Pain occurs randomly throughout the day. Does not wake up at night.   Takes ibuprofen before bed for lower back and wrist, this helps. Feels reduced  strength to the left hand compared the right.       Review of Systems   Constitutional, HEENT, cardiovascular, pulmonary, gi and gu systems are negative, except as otherwise noted.      Objective    /82   Pulse 113   Temp 97.3  F (36.3  C) (Tympanic)   Resp 20   Wt 98.8 kg (217 lb 12.8 oz)   SpO2 97%   BMI 29.95 kg/m    Body mass index is 29.95 kg/m .  Physical Exam   GENERAL: healthy, alert and no distress  MS: normal muscle tone, normal range of motion, no edema and LUE exam shows normal strength and muscle mass, no deformities, no erythema, induration, or nodules, no evidence of joint effusion, ROM of all joints is normal and no evidence of joint instability. Slight tenderness to palpation of trapezius of left  thumb. Negative Phalen and tinel test. RUE is normal.     XR Wrist Left G/E 3 Views    Result Date: 3/1/2023  PROCEDURE:  XR WRIST LEFT G/E 3 VIEWS HISTORY: Left wrist pain COMPARISON:  None. TECHNIQUE:  3 views of the left wrist were obtained. FINDINGS:  Mild degenerative changes are seen at the trapezium first metacarpal and at the scaphotrapezium scaphotrapezoid articulations. There is a small calcification at the tip of the ulnar styloid. No fractures or destructive lesions are noted.     IMPRESSION: Degenerative arthritic changes of the scaphotrapezium, scaphotrapezoid and trapezium first metacarpal articulations  MATHIEU RAY MD   SYSTEM ID:  [id number]

## 2023-03-01 NOTE — PROGRESS NOTES
[facility] HEART CARE   CARDIOLOGY CONSULT     Moses Navarro   [id number] [facility] [address]    [place]     Chief Complaint   Patient presents with     Consult     PAF        HPI:   Mr. Navarro is a 52 year old male who presents for cardiology evaluation with recent recurrence of paroxysmal atrial fibrillation.     Patient had previously been followed by cardiology- [facility] d/t recurrent atrial fibrillation. He had been treated with Sotalol although, even on Sotalol had recurrence of PAF. On 9/29/2011 he underwent complex atrial fibrillation ablation by Dr. Mejia at [facility]. He was discharged on sotalol, Pradaxa and Lanoxin.  On 11/21/2011 his Pradaxa was discontinued and he was placed on aspirin 325 mg daily.  He was also advised to stop his digoxin.  It was recommended he proceed with a 24-hour Holter monitor to make sure he had remained in sinus rhythm before discontinuing the sotalol.    Patient presented to the ED on 10/3/2022 with reports of fluttering palpitations with lightheadedness, onset of symptoms at work that day. Initial ECG was read as SVT with rate 194 bpm. He received IV adenosine with ECG following revealing atrial flutter with variable AV block at 153 bpm with lateral ST depression. He received two doses of IV Diltiazem for which he spontaneously converted to SR at rate of 62 bpm, ST segments normalized. [id number] score of 0 and therefore, he was not started on oral anticoagulation.    He was later evaluated in clinic on 2/2/2023, he endorsed recurrence of palpitations similar to that with AF the prior weekend when laying flat in bed. Described brief symptoms  He received short acting diltiazem, 60 mg to be taken for rapid rate response with arrhythmia/ palpitations when they occur. ECG at this visit revealing NSR. Patient admits that he has not needed to use to Cardizem, no recurrence of palpitations since visit on 2/2/2023.      PAST MEDICAL HISTORY:   Past Medical History:    Diagnosis Date     Atrial fibrillation and flutter     since 01/2011     Injury     crush injury of the right hand     Injury     hx of c-spine injury     Injury of tendon of rotator cuff     No Comments Provided     Other injury of spleen, initial encounter     No Comments Provided          FAMILY HISTORY:   Family History   Problem Relation Age of Onset     Diabetes Father         Diabetes,type II     Hypertension Father         Hypertension     Family History Negative Mother         Good Health     Family History Negative Sister         Good Health     Heart Disease Other      Diabetes Paternal Uncle      Prostate Cancer No family hx of      Colon Cancer No family hx of           PAST SURGICAL HISTORY:   Past Surgical History:   Procedure Laterality Date     ARTHROSCOPY ANKLE, OPEN REPAIR LIGAMENT, COMBINED Right 9/3/2020    Procedure: ARTHROSCOPY, ANKLE, WITH OPEN REPAIR OF ANKLE LIGAMENT;  Surgeon: Ignacio Black DPM;  Location: [facility] OR     ARTHROSCOPY KNEE      Right knee     EP ABLATION FOCAL AFIB  09/29/2011    [facility]     FOOT SURGERY      foot and toe fractures due to accident     OTHER SURGICAL HISTORY      angiogram right hand          SOCIAL HISTORY:   Social History     Socioeconomic History     Marital status:      Spouse name: None     Number of children: None     Years of education: None     Highest education level: None   Tobacco Use     Smoking status: Never     Smokeless tobacco: Never   Vaping Use     Vaping Use: Never used   Substance and Sexual Activity     Alcohol use: Yes     Alcohol/week: 4.0 standard drinks     Types: 4 Cans of beer per week     Drug use: Never     Sexual activity: Yes     Partners: Female   Social History Narrative    .  two child.  Lives in [place].  Works at [employer] .          CURRENT MEDICATIONS:   Prior to Admission medications    Medication Sig Start Date End Date Taking? Authorizing Provider   diltiazem (CARDIZEM) 60 MG tablet 1 every  60 minutes x 4 prn tachycardia  Patient not taking: Reported on 3/1/2023 2/2/23   Tommy Bruner MD          ALLERGIES:   No Known Allergies       ROS:   CONSTITUTIONAL: No reported fever or chills. No changes in weight.  ENT: No visual disturbance, ear ache, epistaxis or sore throat.   CARDIOVASCULAR: No chest pain, chest pressure or chest discomfort. Recurrent palpitations, see HPI. No lower extremity edema.   RESPIRATORY: No shortness of breath, dyspnea upon exertion, cough, wheezing or hemoptysis.   GI: No reported abdominal pain, melena or hematochezia.   : No reported hematuria or dysuria.   NEUROLOGICAL: No lightheadedness, dizziness, syncope, ataxia, paresthesias or weakness.   HEMATOLOGIC: No history of anemia. No bleeding or excessive bruising. No history of blood clots.   MUSCULOSKELETAL: No new joint pain or swelling, no muscle pain.  ENDOCRINOLOGIC: No temperature intolerance. No hair or skin changes.  SKIN: No abnormal rashes or sores, no unusual itching.  PSYCHIATRIC: No history of depression or anxiety. No changes in mood, feeling down or anxious. No changes in sleep.      PHYSICAL EXAM:   /82 (BP Location: Right arm, Patient Position: Sitting, Cuff Size: Adult Large)   Pulse 100   Temp 97.3  F (36.3  C) (Tympanic)   Resp 20   Wt 97.1 kg (214 lb)   SpO2 97%   BMI 29.43 kg/m    GENERAL: The patient is a well-developed, well-nourished, in no apparent distress.  HEENT: Head is normocephalic and atraumatic. Eyes are symmetrical with normal visual tracking. No icterus, no xanthelasmas. Nares appeared normal without nasal drainage. Mucous membranes are moist, no cyanosis.  NECK: Supple, no cervical bruits, JVP not visible.   CHEST/ LUNGS: Lungs clear to auscultation, no rales, rhonchi or wheezes, no use of accessory muscles, no retractions, respirations unlabored and normal respiratory rate.   CARDIO: Regular rate and rhythm normal with S1 and S2, no S3 or S4 and no murmur, click or rub.  Precordium quiet with normal PMI.    ABD: Abdomen is nondistended.   EXTREMITIES: No edema present.   MUSCULOSKELETAL: No visible joint swelling.   NEUROLOGIC: Alert and oriented X3. Normal speech, gait and affect. No focal neurologic deficits.   SKIN: No jaundice. No rashes or visible skin lesions present. No ecchymosis.       EKG:    NSR, rate 78 bpm. No ST-T changes.    LAB RESULTS:   Office Visit on 02/02/2023   Component Date Value Ref Range Status     Ventricular Rate 02/02/2023 75  BPM Final     Atrial Rate 02/02/2023 75  BPM Final     ME Interval 02/02/2023 138  ms Final     QRS Duration 02/02/2023 84  ms Final     QT 02/02/2023 366  ms Final     QTc 02/02/2023 408  ms Final     P Axis 02/02/2023 55  degrees Final     R AXIS 02/02/2023 60  degrees Final     T Axis 02/02/2023 31  degrees Final     Interpretation ECG 02/02/2023    Final                    Value:Sinus rhythm  Normal ECG  When compared with ECG of 03-OCT-2022 11:23,  No significant change was found  Confirmed by MD MADISON, SUMIT ([id number]) on 2/5/2023 8:17:09 AM       Magnesium 02/02/2023 2.1  1.7 - 2.3 mg/dL Final     Sodium 02/02/2023 137  136 - 145 mmol/L Final     Potassium 02/02/2023 4.2  3.4 - 5.3 mmol/L Final     Chloride 02/02/2023 99  98 - 107 mmol/L Final     Carbon Dioxide (CO2) 02/02/2023 29  22 - 29 mmol/L Final     Anion Gap 02/02/2023 9  7 - 15 mmol/L Final     Urea Nitrogen 02/02/2023 18.7  6.0 - 20.0 mg/dL Final     Creatinine 02/02/2023 1.08  0.67 - 1.17 mg/dL Final     Calcium 02/02/2023 9.3  8.6 - 10.0 mg/dL Final     Glucose 02/02/2023 104 (H)  70 - 99 mg/dL Final     Alkaline Phosphatase 02/02/2023 51  40 - 129 U/L Final     AST 02/02/2023 35  10 - 50 U/L Final     ALT 02/02/2023 53 (H)  10 - 50 U/L Final     Protein Total 02/02/2023 7.6  6.4 - 8.3 g/dL Final     Albumin 02/02/2023 4.6  3.5 - 5.2 g/dL Final     Bilirubin Total 02/02/2023 1.3 (H)  <=1.2 mg/dL Final     GFR Estimate 02/02/2023 83  >60 mL/min/1.73m2 Final      TSH 02/02/2023 2.18  0.30 - 4.20 uIU/mL Final     WBC Count 02/02/2023 9.6  4.0 - 11.0 10e3/uL Final     RBC Count 02/02/2023 5.40  4.40 - 5.90 10e6/uL Final     Hemoglobin 02/02/2023 16.7  13.3 - 17.7 g/dL Final     Hematocrit 02/02/2023 47.8  40.0 - 53.0 % Final     MCV 02/02/2023 89  78 - 100 fL Final     MCH 02/02/2023 30.9  26.5 - 33.0 pg Final     MCHC 02/02/2023 34.9  31.5 - 36.5 g/dL Final     RDW 02/02/2023 12.6  10.0 - 15.0 % Final     Platelet Count 02/02/2023 211  150 - 450 10e3/uL Final          ASSESSMENT:   Moses Navarro presents for cardiology evaluation with recent recurrence of paroxysmal atrial fibrillation.   Patient had previously been followed by cardiology- [facility] d/t recurrent atrial fibrillation. He had been treated with Sotalol although, even on Sotalol had recurrence of PAF. On 9/29/2011 he underwent complex atrial fibrillation ablation by Dr. Mejia at [facility].    1. Paroxysmal atrial fibrillation with RVR (H)  2. Status post catheter ablation of atrial fibrillation (9/29/2011)  3. Palpitations    PLAN:   1. Patient with recurrence of PAF, history of complex AF ablation at [facility] in 2011. No recurrence of symptoms until this last fall. AF was confirmed on ECG from ED visit on 10/3/2022.  2. Reviewed rhythm control strategies which may include repeat catheter ablation vs AAT or DCCV if sustained. Previously tolerated Sotalol although, he did have recurrence of AF on Sotalol. No hx structural heart disease, consider as candidate for Flecainide pill in pocket approach if paroxysms remain rare.   3. He may use Cardizem half to to full tab as needed for palpitations. Previously prescribed at last clinic visit, he has not needed to use.  4. Recommended further testing which will include cardiac monitor to identify AF burden and repeat TTE for structural exam.   5. Reviewed recent labs without anemia, stable thyroid function and stable electrolytes. Limited caffeine intake, no tobacco use, no ETOH  abuse, he denies any s/s of sleep apnea.   6. He will follow-up with cardiology when the above testing is completed. We will review rhythm control strategy at this visit based on findings.     Thank you for allowing me to participate in the care of your patient. Please do not hesitate to contact me if you have any questions.     Total time 67 min on date of encounter spent reviewing past medical records, face-to-face time obtaining HPI, physical exam, reviewing results of recent testing counseling on the above diagnoses recommended plan of care.    Desiree Charles, APRN CNP [facility]

## 2023-03-02 ENCOUNTER — TELEPHONE (OUTPATIENT)
Dept: PEDIATRICS | Facility: OTHER | Age: 53
End: 2023-03-02
Payer: COMMERCIAL

## 2023-03-02 DIAGNOSIS — M25.532 LEFT WRIST PAIN: Primary | ICD-10-CM

## 2023-03-02 LAB
ATRIAL RATE - MUSE: 78 BPM
DIASTOLIC BLOOD PRESSURE - MUSE: NORMAL MMHG
INTERPRETATION ECG - MUSE: NORMAL
P AXIS - MUSE: 80 DEGREES
PR INTERVAL - MUSE: 134 MS
QRS DURATION - MUSE: 86 MS
QT - MUSE: 352 MS
QTC - MUSE: 401 MS
R AXIS - MUSE: 76 DEGREES
SYSTOLIC BLOOD PRESSURE - MUSE: NORMAL MMHG
T AXIS - MUSE: 50 DEGREES
VENTRICULAR RATE- MUSE: 78 BPM

## 2023-03-02 NOTE — TELEPHONE ENCOUNTER
We received PT orders for this patient for wrist pain, we would need OT orders for this diagnosis. Thank you.    Abhishek Walker on 3/2/2023 at 4:06 PM

## 2023-03-06 NOTE — TELEPHONE ENCOUNTER
Dr Alonzo is out of office. Sending to PCP to sign.  Norma J. Gosselin, LPN .......  3/6/2023  1:38 PM

## 2023-03-14 ENCOUNTER — HOSPITAL ENCOUNTER (OUTPATIENT)
Dept: CARDIOLOGY | Facility: OTHER | Age: 53
Discharge: HOME | End: 2023-03-14
Attending: NURSE PRACTITIONER
Payer: COMMERCIAL

## 2023-03-14 DIAGNOSIS — Z98.890 STATUS POST CATHETER ABLATION OF ATRIAL FIBRILLATION: ICD-10-CM

## 2023-03-14 DIAGNOSIS — I48.0 PAROXYSMAL ATRIAL FIBRILLATION WITH RVR (H): ICD-10-CM

## 2023-03-14 LAB — LVEF ECHO: NORMAL

## 2023-03-14 PROCEDURE — 93306 TTE W/DOPPLER COMPLETE: CPT | Mod: 26 | Performed by: STUDENT IN AN ORGANIZED HEALTH CARE EDUCATION/TRAINING PROGRAM

## 2023-03-14 PROCEDURE — 999N000157 HC STATISTIC RCP TIME EA 10 MIN

## 2023-03-14 PROCEDURE — 93248 EXT ECG>7D<15D REV&INTERPJ: CPT | Performed by: INTERNAL MEDICINE

## 2023-03-14 PROCEDURE — 93306 TTE W/DOPPLER COMPLETE: CPT

## 2023-03-14 PROCEDURE — 93246 EXT ECG>7D<15D RECORDING: CPT

## 2023-03-14 NOTE — PATIENT INSTRUCTIONS
Date Placed on Pt:  03/14/2023    Patient instructed not to:   -take baths, swim, sauna   -remove device prior to 14 days   -use electric blankets   -shower or sweat excessively within first 24 hours of device application  Patient instructed to:   -shower as needed   -be carefull when toweling off and dressing   -press button when cardiac symptoms occur   -document symptoms in log book   -remove and return device (send via mail to [email or web address])   -Call [facility] with any questions

## 2023-05-03 ENCOUNTER — MYC MEDICAL ADVICE (OUTPATIENT)
Dept: CARDIOLOGY | Facility: OTHER | Age: 53
End: 2023-05-03
Payer: COMMERCIAL

## 2023-05-04 NOTE — TELEPHONE ENCOUNTER
Patient is calling for results from [facility] [facility] which are below.      Tommy Bruner MD  to Keegan AGUSTÍN Navarro    TP      5/3/23  9:35 AM  The monitor result was normal. This happens often, like putting a game camera in the woods. The treatment is not changed based on this monitor, but if symptoms are getting more often, we can either repeat it, or we can have the cardiologist put in an implanted monitor.    Mirna Hoff LPN on 5/4/2023 at 10:29 AM

## 2023-06-04 ENCOUNTER — HEALTH MAINTENANCE LETTER (OUTPATIENT)
Age: 53
End: 2023-06-04

## 2023-12-20 ENCOUNTER — OFFICE VISIT (OUTPATIENT)
Dept: FAMILY MEDICINE | Facility: OTHER | Age: 53
End: 2023-12-20
Attending: FAMILY MEDICINE
Payer: COMMERCIAL

## 2023-12-20 VITALS
RESPIRATION RATE: 16 BRPM | TEMPERATURE: 97.8 F | OXYGEN SATURATION: 96 % | DIASTOLIC BLOOD PRESSURE: 78 MMHG | SYSTOLIC BLOOD PRESSURE: 124 MMHG | BODY MASS INDEX: 29.16 KG/M2 | WEIGHT: 212 LBS | HEART RATE: 108 BPM

## 2023-12-20 DIAGNOSIS — M18.12 ARTHRITIS OF CARPOMETACARPAL (CMC) JOINT OF LEFT THUMB: ICD-10-CM

## 2023-12-20 DIAGNOSIS — M19.032 ARTHRITIS OF SCAPHOID-TRAPEZIUM-TRAPEZOID JOINT OF LEFT HAND: Primary | ICD-10-CM

## 2023-12-20 PROCEDURE — 99214 OFFICE O/P EST MOD 30 MIN: CPT | Performed by: FAMILY MEDICINE

## 2023-12-20 ASSESSMENT — PAIN SCALES - GENERAL: PAINLEVEL: SEVERE PAIN (7)

## 2023-12-20 NOTE — PROGRESS NOTES
Sports Medicine Office Note    HPI:  53-year-old male coming in for evaluation of left wrist pain.  His pain has been present for approximately 1 year.  No inciting event or injury.  His pain is intermittent.  It will flareup with gripping/grasping.  He rates his pain at 7/10 during these times.  He characterizes the pain as stabbing.  He has been using ice to help with his symptoms.  He was seen in primary care clinic on 3/1 and an x-ray was performed.      EXAM:  /78 (BP Location: Right arm, Patient Position: Sitting, Cuff Size: Adult Large)   Pulse 108   Temp 97.8  F (36.6  C) (Temporal)   Resp 16   Wt 96.2 kg (212 lb)   SpO2 96%   BMI 29.16 kg/m    MUSCULOSKELETAL EXAM:  LEFT WRIST  Inspection:  -No gross deformity  -No bruising or swelling  -Scars:  None    Tenderness to palpation of the:  -Medial epicondyle:  Negative  -Lateral epicondyle:  Negative  -Radial head:  Negative  -Radial shaft:  Negative  -Ulnar shaft:  Negative  -Forearm flexors and extensors:  Negative  -Ulnar styloid:  Negative  -Distal radius:  Negative  -Patrick's tubercle:  Negative  -Scapholunate ligament: Mild pain  -Scaphoid in anatomical snuffbox: Positive  -1st CMC joint: Positive  -1st MCP joint:  Negative  -Metacarpals:  Negative    Strength:  - strength:  5/5  -Wrist extension:  5/5    Sensation:  -Intact to light touch in the radial, median, and ulnar nerve distributions    Motor:  -Intact AIN, PIN, and IO    Special Tests:  -Finkelstein test:  Negative  -1st CMC grind test: Positive  -Valgus stress at 1st MCP:  Negative    Other:  -No signs of cyanosis. Normal skin temperature of the upper extremity.  -Elbow:  No gross deformity. Full range of motion.  -Right wrist:  No gross deformity. No palpable tenderness. Normal strength and ROM.      IMAGING:  3/1/2023: 3 view left wrist x-ray  - No fracture, dislocation, or bony lesion.  Moderate degenerative changes of the STT joint.  Mild degenerative changes of the first CMC  joint.  Calcification off of the tip of the ulnar styloid which may represent an old injury.      ASSESSMENT/PLAN:  Diagnoses and all orders for this visit:  Arthritis of vwqpgxrb-irnnpumcb-accgyxoft joint of left hand  Arthritis of carpometacarpal (CMC) joint of left thumb    53-year-old male with arthritis of the STT joint of the left wrist.  X-rays from 3/1 were personally reviewed in the office with the findings as demonstrated above by my interpretation.  Treatment options were reviewed which include activity modification, bracing, ice, topical medications, oral medications, and injections.  Surgery can be considered as a treatment of last resort.  - After reviewing treatment options, patient would like to proceed with an intra-articular CSI  - Follow-up in the office for an intra-articular STT CSI under ultrasound guidance    First CMC joint arthritis:  On exam it is difficult to differentiate whether pain is coming from first CMC joint or STT joint.  Radiographically, more degenerative findings are seen at the STT joint.  - Treatment plan as outlined above  - If injection fails to provide desired symptom relief, consider repeating the procedure targeting the first CMC joint      Home Mast MD  12/20/2023  9:45 AM    Total time spent with this patient was 18 minutes which included chart review, visualization and independent interpretation of images, time spent with the patient, and documentation.    Procedure time:  0 minute(s)

## 2024-01-04 ENCOUNTER — OFFICE VISIT (OUTPATIENT)
Dept: FAMILY MEDICINE | Facility: OTHER | Age: 54
End: 2024-01-04
Attending: FAMILY MEDICINE
Payer: COMMERCIAL

## 2024-01-04 VITALS
HEART RATE: 85 BPM | SYSTOLIC BLOOD PRESSURE: 128 MMHG | RESPIRATION RATE: 16 BRPM | DIASTOLIC BLOOD PRESSURE: 82 MMHG | OXYGEN SATURATION: 98 % | WEIGHT: 217 LBS | TEMPERATURE: 97.6 F | BODY MASS INDEX: 29.84 KG/M2

## 2024-01-04 DIAGNOSIS — M18.12 ARTHRITIS OF CARPOMETACARPAL (CMC) JOINT OF LEFT THUMB: ICD-10-CM

## 2024-01-04 DIAGNOSIS — M19.032 ARTHRITIS OF SCAPHOID-TRAPEZIUM-TRAPEZOID JOINT OF LEFT HAND: Primary | ICD-10-CM

## 2024-01-04 PROCEDURE — 20606 DRAIN/INJ JOINT/BURSA W/US: CPT | Mod: LT | Performed by: FAMILY MEDICINE

## 2024-01-04 PROCEDURE — 250N000009 HC RX 250: Performed by: FAMILY MEDICINE

## 2024-01-04 PROCEDURE — 250N000011 HC RX IP 250 OP 636: Performed by: FAMILY MEDICINE

## 2024-01-04 RX ORDER — TRIAMCINOLONE ACETONIDE 40 MG/ML
20 INJECTION, SUSPENSION INTRA-ARTICULAR; INTRAMUSCULAR ONCE
Status: COMPLETED | OUTPATIENT
Start: 2024-01-04 | End: 2024-01-04

## 2024-01-04 RX ORDER — LIDOCAINE HYDROCHLORIDE 10 MG/ML
0.5 INJECTION, SOLUTION EPIDURAL; INFILTRATION; INTRACAUDAL; PERINEURAL ONCE
Status: COMPLETED | OUTPATIENT
Start: 2024-01-04 | End: 2024-01-04

## 2024-01-04 RX ADMIN — LIDOCAINE HYDROCHLORIDE 0.5 ML: 10 INJECTION, SOLUTION INFILTRATION; PERINEURAL at 10:01

## 2024-01-04 RX ADMIN — TRIAMCINOLONE ACETONIDE 20 MG: 40 INJECTION, SUSPENSION INTRA-ARTICULAR; INTRAMUSCULAR at 10:01

## 2024-01-04 ASSESSMENT — PAIN SCALES - GENERAL: PAINLEVEL: NO PAIN (1)

## 2024-01-04 NOTE — PROGRESS NOTES
Sports Medicine Office Note    HPI:  53-year-old male coming in for follow-up evaluation of left wrist pain.  His pain has been present for approximately 1 year.  No inciting event or injury.  His pain is intermittent.  It flares with gripping/grasping.  He was seen in this office on 12/20.  At that time we discussed treatment options for his pain.  He comes in today for an ultrasound-guided injection into his left STT joint.  No changes to his symptoms.      EXAM:  /82 (BP Location: Right arm, Patient Position: Sitting, Cuff Size: Adult Regular)   Pulse 85   Temp 97.6  F (36.4  C) (Temporal)   Resp 16   Wt 98.4 kg (217 lb)   SpO2 98%   BMI 29.84 kg/m    Musculoskeletal exam: Left wrist  - No gross deformity  - No bruising or soft tissue swelling  - Tenderness to palpation at the base of the thumb  - Normal skin temperature without cyanosis      IMAGING:  3/1/2023: 3 view left wrist x-ray  - No fracture, dislocation, or bony lesion.  Moderate degenerative changes of the STT joint.  Mild degenerative changes of the first CMC joint.  Calcification off of the tip of the ulnar styloid which may represent an old injury.      ASSESSMENT/PLAN:  Diagnoses and all orders for this visit:  Arthritis of yfunsqpk-tpznraqwn-xcpuzotzs joint of left hand  -     MS ARTHROCENTESIS ASPIR&/INJ INTERM JT/BURS W/US  -     triamcinolone (KENALOG-40) injection 20 mg  -     lidocaine (PF) (XYLOCAINE) 1 % injection 0.5 mL  -     lidocaine (PF) (XYLOCAINE) 1 % injection 0.5 mL  -     POC US SOFT TISSUE  Arthritis of carpometacarpal (CMC) joint of left thumb    53-year-old male with STT arthritis of the left wrist.  X-rays from 1/3 were again personally reviewed in the office with the findings as demonstrated above by my interpretation.  Treatment options include activity modification, bracing, ice, topical medications, oral medications, and injections.  After reviewing the risks/benefits, the patient elects to proceed with an  ultrasound-guided injection into the left STT joint.  See procedure note below:    PROCEDURE:  Ultrasound Guided Injection of the Left Lzicnuzn-Jrhcodmvx-Zyfwreotp joint    EQUIPMENT:  Yvonne Affiniti 70 with Linear L15-7io (7-15MHz) Transducer (Hockey Stick).      PROCEDURAL PAUSE:    A procedural pause was conducted to verify:  correct patient identity, procedure to be performed, and as applicable, correct side, site, and correct patient position.      INFORMED CONSENT:   I discussed the risks, possible benefits, and alternatives to injection.  Following denial of allergy and review of potential side effects and complications (including but not necessarily limited to infection, allergic reaction, fat necrosis, skin depigmentation, local tissue breakdown, systemic effects of corticosteroids, elevation of blood glucose, injury to soft tissue and/or nerves, and seizure), all questions were answered, and consent was given to proceed.  Patient verbalized understanding.      PROCEDURE DETAILS:    The use of direct ultrasound visualization of the needle (rather than a non-guided ultrasound procedure) was required to increase patient safety by excluding inadvertent intramuscular or intratendinous placement and minimizing bleeding and injury by avoiding osteochondral and nearby neurovascular structures.  Guidance also maximizes accurate injection placement and likely clinical benefit beyond that obtained with a non-guided injection.  This allows increased diagnostic specificity when evaluating effectiveness of the injection.      Prior to the procedure, the left base of the thumb was examined with a 15MHz linear hockey stick transducer to determine the optimal needle path and visualize the radial aspect of the Agortibo-Jrzgnensz-Hfoojysrl joint.  Following this, the skin was marked, and the base of the thumb was prepared with chlorhexidine.  Local anesthesia was obtained with 0.5mL of 1% lidocaine.  Then this area was  re-examined using the same transducer, a sterile ultrasound transducer cover, sterile gloves, and sterile gel.  Under ultrasound guidance, a 1.5-inch 25-gauge needle was advanced from distal to proximal taking an in-plane approach into the Ihnzawhd-Tlbjqxcps-Alrtlfslh joint.  One needle pass was performed.  After ultrasound visualization of the needle tip in the target area and negative aspiration for blood, a mixture of 0.5mL of 1% lidocaine and 0.5mL of triamcinolone (40mg/mL) was injected into the left Povfuawr-Ilqppzssm-Yqoiovtik joint.  0mL was wasted.  Images have been saved on the musculoskeletal ultrasound in clinic.      The patient tolerated the procedure without complication and was discharged in good condition after a short observation period.  The patient was instructed to contact me regarding any questions pertaining to the procedure.      DIAGNOSIS:    -Successful ultrasound guided injection of the left Enrcaopp-Kafqmyrty-Yfhwddlvf joint without immediate complication      PLAN:   -Post-procedure care reviewed, including avoiding submersion of the injection site for 48 hours   -Return precautions reviewed for signs/symptoms that would be concerning for infection   -The patient was instructed to ice and take APAP this evening if needed   -Return to clinic as needed  -If the above injection fails to provide desired symptom relief, consider repeating injection into the first CMC joint      Home Mast MD  1/4/2024  9:28 AM    Total time spent with this patient was 33 minutes which included chart review, visualization and independent interpretation of images, time spent with the patient, and documentation.    Procedure time:  22 minute(s)

## 2024-01-10 ENCOUNTER — APPOINTMENT (OUTPATIENT)
Dept: GENERAL RADIOLOGY | Facility: OTHER | Age: 54
End: 2024-01-10
Attending: FAMILY MEDICINE
Payer: COMMERCIAL

## 2024-01-10 ENCOUNTER — HOSPITAL ENCOUNTER (EMERGENCY)
Facility: OTHER | Age: 54
Discharge: ANOTHER HOSPITAL | End: 2024-01-10
Attending: FAMILY MEDICINE | Admitting: FAMILY MEDICINE
Payer: COMMERCIAL

## 2024-01-10 VITALS
DIASTOLIC BLOOD PRESSURE: 117 MMHG | OXYGEN SATURATION: 96 % | SYSTOLIC BLOOD PRESSURE: 135 MMHG | BODY MASS INDEX: 30.38 KG/M2 | HEART RATE: 93 BPM | TEMPERATURE: 97.8 F | HEIGHT: 71 IN | RESPIRATION RATE: 16 BRPM | WEIGHT: 217 LBS

## 2024-01-10 DIAGNOSIS — I47.10 PAROXYSMAL SUPRAVENTRICULAR TACHYCARDIA (H): ICD-10-CM

## 2024-01-10 DIAGNOSIS — Z98.890 STATUS POST CATHETER ABLATION OF ATRIAL FIBRILLATION: ICD-10-CM

## 2024-01-10 LAB
ANION GAP SERPL CALCULATED.3IONS-SCNC: 10 MMOL/L (ref 7–15)
ATRIAL RATE - MUSE: 174 BPM
BASOPHILS # BLD AUTO: 0.1 10E3/UL (ref 0–0.2)
BASOPHILS NFR BLD AUTO: 1 %
BUN SERPL-MCNC: 15.9 MG/DL (ref 6–20)
CALCIUM SERPL-MCNC: 9.2 MG/DL (ref 8.6–10)
CHLORIDE SERPL-SCNC: 102 MMOL/L (ref 98–107)
CREAT SERPL-MCNC: 1.03 MG/DL (ref 0.67–1.17)
DEPRECATED HCO3 PLAS-SCNC: 27 MMOL/L (ref 22–29)
DIASTOLIC BLOOD PRESSURE - MUSE: NORMAL MMHG
EGFRCR SERPLBLD CKD-EPI 2021: 87 ML/MIN/1.73M2
EOSINOPHIL # BLD AUTO: 0.2 10E3/UL (ref 0–0.7)
EOSINOPHIL NFR BLD AUTO: 1 %
ERYTHROCYTE [DISTWIDTH] IN BLOOD BY AUTOMATED COUNT: 12.6 % (ref 10–15)
GLUCOSE SERPL-MCNC: 108 MG/DL (ref 70–99)
HCT VFR BLD AUTO: 49.2 % (ref 40–53)
HGB BLD-MCNC: 18.1 G/DL (ref 13.3–17.7)
HOLD SPECIMEN: NORMAL
HOLD SPECIMEN: NORMAL
IMM GRANULOCYTES # BLD: 0.1 10E3/UL
IMM GRANULOCYTES NFR BLD: 0 %
INTERPRETATION ECG - MUSE: NORMAL
LYMPHOCYTES # BLD AUTO: 2.7 10E3/UL (ref 0.8–5.3)
LYMPHOCYTES NFR BLD AUTO: 23 %
MCH RBC QN AUTO: 32 PG (ref 26.5–33)
MCHC RBC AUTO-ENTMCNC: 36.8 G/DL (ref 31.5–36.5)
MCV RBC AUTO: 87 FL (ref 78–100)
MONOCYTES # BLD AUTO: 0.8 10E3/UL (ref 0–1.3)
MONOCYTES NFR BLD AUTO: 7 %
NEUTROPHILS # BLD AUTO: 8.3 10E3/UL (ref 1.6–8.3)
NEUTROPHILS NFR BLD AUTO: 68 %
NRBC # BLD AUTO: 0 10E3/UL
NRBC BLD AUTO-RTO: 0 /100
P AXIS - MUSE: NORMAL DEGREES
PLATELET # BLD AUTO: 234 10E3/UL (ref 150–450)
POTASSIUM SERPL-SCNC: 3.9 MMOL/L (ref 3.4–5.3)
PR INTERVAL - MUSE: NORMAL MS
QRS DURATION - MUSE: 82 MS
QT - MUSE: 266 MS
QTC - MUSE: 460 MS
R AXIS - MUSE: 69 DEGREES
RBC # BLD AUTO: 5.66 10E6/UL (ref 4.4–5.9)
SODIUM SERPL-SCNC: 139 MMOL/L (ref 135–145)
SYSTOLIC BLOOD PRESSURE - MUSE: NORMAL MMHG
T AXIS - MUSE: 42 DEGREES
TROPONIN T SERPL HS-MCNC: 11 NG/L
VENTRICULAR RATE- MUSE: 180 BPM
WBC # BLD AUTO: 12 10E3/UL (ref 4–11)

## 2024-01-10 PROCEDURE — 85025 COMPLETE CBC W/AUTO DIFF WBC: CPT | Performed by: FAMILY MEDICINE

## 2024-01-10 PROCEDURE — 93005 ELECTROCARDIOGRAM TRACING: CPT | Mod: 76 | Performed by: FAMILY MEDICINE

## 2024-01-10 PROCEDURE — 93010 ELECTROCARDIOGRAM REPORT: CPT | Performed by: STUDENT IN AN ORGANIZED HEALTH CARE EDUCATION/TRAINING PROGRAM

## 2024-01-10 PROCEDURE — 99285 EMERGENCY DEPT VISIT HI MDM: CPT | Mod: 25 | Performed by: FAMILY MEDICINE

## 2024-01-10 PROCEDURE — 71045 X-RAY EXAM CHEST 1 VIEW: CPT

## 2024-01-10 PROCEDURE — 36415 COLL VENOUS BLD VENIPUNCTURE: CPT | Performed by: FAMILY MEDICINE

## 2024-01-10 PROCEDURE — 258N000003 HC RX IP 258 OP 636: Performed by: FAMILY MEDICINE

## 2024-01-10 PROCEDURE — 250N000013 HC RX MED GY IP 250 OP 250 PS 637: Performed by: FAMILY MEDICINE

## 2024-01-10 PROCEDURE — 99285 EMERGENCY DEPT VISIT HI MDM: CPT | Performed by: FAMILY MEDICINE

## 2024-01-10 PROCEDURE — 96365 THER/PROPH/DIAG IV INF INIT: CPT | Performed by: FAMILY MEDICINE

## 2024-01-10 PROCEDURE — 80048 BASIC METABOLIC PNL TOTAL CA: CPT | Performed by: FAMILY MEDICINE

## 2024-01-10 PROCEDURE — 93005 ELECTROCARDIOGRAM TRACING: CPT | Performed by: FAMILY MEDICINE

## 2024-01-10 PROCEDURE — 84484 ASSAY OF TROPONIN QUANT: CPT | Performed by: FAMILY MEDICINE

## 2024-01-10 PROCEDURE — 250N000011 HC RX IP 250 OP 636: Performed by: FAMILY MEDICINE

## 2024-01-10 PROCEDURE — 96366 THER/PROPH/DIAG IV INF ADDON: CPT | Performed by: FAMILY MEDICINE

## 2024-01-10 RX ORDER — DILTIAZEM HCL 60 MG
60 TABLET ORAL EVERY 6 HOURS SCHEDULED
Status: DISCONTINUED | OUTPATIENT
Start: 2024-01-10 | End: 2024-01-10 | Stop reason: HOSPADM

## 2024-01-10 RX ORDER — DILTIAZEM HYDROCHLORIDE 120 MG/1
120 CAPSULE, EXTENDED RELEASE ORAL DAILY
Status: DISCONTINUED | OUTPATIENT
Start: 2024-01-10 | End: 2024-01-10 | Stop reason: HOSPADM

## 2024-01-10 RX ORDER — SODIUM CHLORIDE 9 MG/ML
INJECTION, SOLUTION INTRAVENOUS CONTINUOUS
Status: DISCONTINUED | OUTPATIENT
Start: 2024-01-10 | End: 2024-01-10 | Stop reason: HOSPADM

## 2024-01-10 RX ORDER — DILTIAZEM HCL/D5W 125 MG/125
5-15 PLASTIC BAG, INJECTION (ML) INTRAVENOUS CONTINUOUS
Status: DISCONTINUED | OUTPATIENT
Start: 2024-01-10 | End: 2024-01-10 | Stop reason: HOSPADM

## 2024-01-10 RX ORDER — DILTIAZEM HYDROCHLORIDE 5 MG/ML
20 INJECTION INTRAVENOUS ONCE
Status: COMPLETED | OUTPATIENT
Start: 2024-01-10 | End: 2024-01-10

## 2024-01-10 RX ADMIN — Medication 5 MG/HR: at 11:54

## 2024-01-10 RX ADMIN — SODIUM CHLORIDE: 9 INJECTION, SOLUTION INTRAVENOUS at 12:53

## 2024-01-10 RX ADMIN — SODIUM CHLORIDE 500 ML: 9 INJECTION, SOLUTION INTRAVENOUS at 11:26

## 2024-01-10 RX ADMIN — DILTIAZEM HYDROCHLORIDE 60 MG: 60 TABLET, FILM COATED ORAL at 12:08

## 2024-01-10 RX ADMIN — DILTIAZEM HYDROCHLORIDE 120 MG: 120 CAPSULE, COATED, EXTENDED RELEASE ORAL at 11:46

## 2024-01-10 ASSESSMENT — ENCOUNTER SYMPTOMS
SHORTNESS OF BREATH: 0
PALPITATIONS: 1

## 2024-01-10 ASSESSMENT — ACTIVITIES OF DAILY LIVING (ADL)
ADLS_ACUITY_SCORE: 35

## 2024-01-10 NOTE — ED TRIAGE NOTES
Pt here with intermittent feelings of tachycardia since last night, pt has had these episodes in the past, VSS, pt into [facility] ambulatory, placed on monitor with 's EKG being obtained

## 2024-01-10 NOTE — LETTER
April 8, 2024      To Whom It May Concern:    Moses Navarro was seen in our Emergency Department on January 10, 2024. At the time of his transfer he had been having runs of V Tach at a rate of 180 bpm followed by SVT at a rate of 180 bpm. He was on a diltiazem drip.     He has had all his previous surgical cardiac care and his follow up visits at [facility] and for this reason, continuity of cardiac care in a critically ill patient, we decided to transfer the patient back to [facility].    Although the patient was in a normal rhythm by the time they arrived at [facility], there was no way to predict this at the time of transfer.  Transfer by ground to [facility] would have taken at least three hours, likely four hours, and staff in a ground ambulance do not have the same level of training or the same access to interventions as an air ambulance.     I recognize that there is a significant difference in cost between the two but given his vital signs at time of transfer I think air transport was appropriate.     Sincerely,          Jesus Navarrete MD

## 2024-01-10 NOTE — ED PROVIDER NOTES
"  History     Chief Complaint   Patient presents with    Tachycardia     The history is provided by the patient and medical records.     Moses Navarro is a 53 year old male with history of atrial fib  Rapid heart rate but no chest pain or SOB  Started yesterday and was intermittent so he took one dose of Cardizem 60 mg  Did not take another dose  Remains without symptoms but his heart rate is persistently elevated today    History of ablation in 2011 at [facility]- he had been started on sotalol but was having break through episodes- saw Dr Mejia for ablation Sept 29, 2011 and discharged on sotalol. He is now on Cardizem \"pill in pocket\" plan, no other meds. He has a Cardiology appointment January 25.     Allergies:  No Known Allergies    Problem List:    Patient Active Problem List    Diagnosis Date Noted    Status post catheter ablation of atrial fibrillation (9/29/2011) 03/01/2023     Priority: Medium    Hematuria 02/03/2017     Priority: Medium    Other dyspnea and respiratory abnormality 05/07/2012     Priority: Medium    Insomnia, persistent 10/19/2011     Priority: Medium    Crush injury of hand 03/08/2011     Priority: Medium    Atrial flutter (H) 01/25/2011     Priority: Medium        Past Medical History:    Past Medical History:   Diagnosis Date    Atrial fibrillation and flutter     Injury     Injury     Injury of tendon of rotator cuff     Other injury of spleen, initial encounter        Past Surgical History:    Past Surgical History:   Procedure Laterality Date    ARTHROSCOPY ANKLE, OPEN REPAIR LIGAMENT, COMBINED Right 9/3/2020    Procedure: ARTHROSCOPY, ANKLE, WITH OPEN REPAIR OF ANKLE LIGAMENT;  Surgeon: Ignacio Black DPM;  Location: [facility] OR    ARTHROSCOPY KNEE      Right knee    EP ABLATION FOCAL AFIB  09/29/2011    [facility]    FOOT SURGERY      foot and toe fractures due to accident    OTHER SURGICAL HISTORY      angiogram right hand       Family History:    Family History   Problem Relation " "Age of Onset    Diabetes Father         Diabetes,type II    Hypertension Father         Hypertension    Family History Negative Mother         Good Health    Family History Negative Sister         Good Health    Heart Disease Other     Diabetes Paternal Uncle     Prostate Cancer No family hx of     Colon Cancer No family hx of        Social History:  Marital Status:   [2]  Social History     Tobacco Use    Smoking status: Never    Smokeless tobacco: Never   Vaping Use    Vaping Use: Never used   Substance Use Topics    Alcohol use: Yes     Alcohol/week: 4.0 standard drinks of alcohol     Types: 4 Cans of beer per week    Drug use: Never        Medications:    diltiazem (CARDIZEM) 60 MG tablet          Review of Systems   Respiratory:  Negative for shortness of breath.    Cardiovascular:  Positive for palpitations. Negative for chest pain.   All other systems reviewed and are negative.      Physical Exam   BP: (!) 156/107  Pulse: (!) 180  Temp: 97.8  F (36.6  C)  Resp: 18  Height: 180.3 cm (5' 11\")  Weight: 98.4 kg (217 lb)  SpO2: 98 %      Physical Exam  Vitals and nursing note reviewed.   Constitutional:       General: He is not in acute distress.     Appearance: Normal appearance. He is not ill-appearing or toxic-appearing.   Cardiovascular:      Rate and Rhythm: Regular rhythm. Tachycardia present.      Pulses: Normal pulses.      Heart sounds: Normal heart sounds. No murmur heard.  Pulmonary:      Effort: Pulmonary effort is normal. No respiratory distress.      Breath sounds: Normal breath sounds.   Musculoskeletal:      Right lower leg: No edema.      Left lower leg: No edema.   Skin:     General: Skin is warm and dry.      Findings: No erythema.   Neurological:      General: No focal deficit present.      Mental Status: He is alert and oriented to person, place, and time.   Psychiatric:         Mood and Affect: Mood normal.         Behavior: Behavior normal.       EKG: SVT, rate 180 normal " axis.    Second EKG: NSR, rate 100, normal axis    Results for orders placed or performed during the hospital encounter of 01/10/24 (from the past 24 hour(s))   EKG 12 lead   Result Value Ref Range    Systolic Blood Pressure  mmHg    Diastolic Blood Pressure  mmHg    Ventricular Rate 180 BPM    Atrial Rate 174 BPM    NV Interval  ms    QRS Duration 82 ms     ms    QTc 460 ms    P Axis  degrees    R AXIS 69 degrees    T Axis 42 degrees    Interpretation ECG       Supraventricular tachycardia  Nonspecific ST abnormality  Abnormal ECG  When compared with ECG of 01-MAR-2023 09:03,  Vent. rate has increased  BPM  ST now depressed in Anterior leads  Confirmed by Alexis Saenz ([id number]) on 1/10/2024 9:55:28 PM     [facility] Draw    Narrative    The following orders were created for panel order [facility] Draw.  Procedure                               Abnormality         Status                     ---------                               -----------         ------                     Extra Blue Top Tube[[id number]]                              Final result               Extra Red Top Tube[[id number]]                               Final result               Extra Green Top (Lithium...[[id number]]                                                 Extra Purple Top Tube[[id number]]                                                       Extra Green Top (Lithium...[[id number]]                                                   Please view results for these tests on the individual orders.   CBC with platelets differential    Narrative    The following orders were created for panel order CBC with platelets differential.  Procedure                               Abnormality         Status                     ---------                               -----------         ------                     CBC with platelets and d...[[id number]]  Abnormal            Final result                 Please view results for these tests on the individual  orders.   Basic metabolic panel   Result Value Ref Range    Sodium 139 135 - 145 mmol/L    Potassium 3.9 3.4 - 5.3 mmol/L    Chloride 102 98 - 107 mmol/L    Carbon Dioxide (CO2) 27 22 - 29 mmol/L    Anion Gap 10 7 - 15 mmol/L    Urea Nitrogen 15.9 6.0 - 20.0 mg/dL    Creatinine 1.03 0.67 - 1.17 mg/dL    GFR Estimate 87 >60 mL/min/1.73m2    Calcium 9.2 8.6 - 10.0 mg/dL    Glucose 108 (H) 70 - 99 mg/dL   Troponin T, High Sensitivity   Result Value Ref Range    Troponin T, High Sensitivity 11 <=22 ng/L   Extra Blue Top Tube   Result Value Ref Range    Hold Specimen JIC    Extra Red Top Tube   Result Value Ref Range    Hold Specimen JIC    CBC with platelets and differential   Result Value Ref Range    WBC Count 12.0 (H) 4.0 - 11.0 10e3/uL    RBC Count 5.66 4.40 - 5.90 10e6/uL    Hemoglobin 18.1 (H) 13.3 - 17.7 g/dL    Hematocrit 49.2 40.0 - 53.0 %    MCV 87 78 - 100 fL    MCH 32.0 26.5 - 33.0 pg    MCHC 36.8 (H) 31.5 - 36.5 g/dL    RDW 12.6 10.0 - 15.0 %    Platelet Count 234 150 - 450 10e3/uL    % Neutrophils 68 %    % Lymphocytes 23 %    % Monocytes 7 %    % Eosinophils 1 %    % Basophils 1 %    % Immature Granulocytes 0 %    NRBCs per 100 WBC 0 <1 /100    Absolute Neutrophils 8.3 1.6 - 8.3 10e3/uL    Absolute Lymphocytes 2.7 0.8 - 5.3 10e3/uL    Absolute Monocytes 0.8 0.0 - 1.3 10e3/uL    Absolute Eosinophils 0.2 0.0 - 0.7 10e3/uL    Absolute Basophils 0.1 0.0 - 0.2 10e3/uL    Absolute Immature Granulocytes 0.1 <=0.4 10e3/uL    Absolute NRBCs 0.0 10e3/uL   XR Chest Port 1 View    Narrative    PROCEDURE:  XR CHEST PORT 1 VIEW    HISTORY: tachycardia    COMPARISON:  Radiograph 10/3/2022    FINDINGS: Single view chest radiograph    Cardiomediastinal silhouette is within normal limits.  No focal consolidation, effusion or pneumothorax.    No suspicious osseous lesion or subdiaphragmatic free air.      Impression    IMPRESSION:   No acute cardiopulmonary process.      PAKO YAN MD         SYSTEM ID:  [id number]  "      Medications   diltiazem (CARDIZEM) injection 20 mg (20 mg Intravenous Not Given 1/10/24 1155)   sodium chloride 0.9% BOLUS 500 mL (0 mLs Intravenous Stopped 1/10/24 1251)   sodium chloride 0.9% BOLUS 500 mL (500 mLs Intravenous Not Given 1/10/24 1130)       Assessments & Plan (with Medical Decision Making)  Moses Navarro is a 53 year old male with history of atrial fib  Rapid heart rate but no chest pain or SOB  Started yesterday and was intermittent so he took one dose of Cardizem 60 mg  Did not take another dose and does not have it with him today.  Remains without symptoms but his heart rate is persistently elevated today  Review of notes from [facility]:  History of ablation in 2011 at [facility]- he had been started on sotalol but was having break through episodes- saw Dr Mejia for ablation Sept 29, 2011 and discharged on sotalol. He is now on Cardizem \"pill in pocket\" plan, no other meds. He has a Cardiology appointment January 25.   VS in the ED BP (!) 135/117   Pulse 93   Temp 97.8  F (36.6  C) (Tympanic)   Resp 16   Ht 1.803 m (5' 11\")   Wt 98.4 kg (217 lb)   SpO2 96%   BMI 30.27 kg/m     He denies symptoms at this time.  Heart sound normal. Lungs clear.  Initial EKG shows SVT with rate 180 bpm.  We did try a Modified Valsalva to no avail.   Labs show CBC with WBC 12,000, hgb 18.1, BMP normal, troponin 11.   11:18 AM  He had one brief episode where his heart rate was in the 90's and then went back to 180's.  We are giving 500 mL bolus of NS.  It looks like he is back in a NSR with rate 97 bpm with no interventions that likely caused it. The IVF had barely been started.  Repeat EKG shows NSR, rate 100.   11:36 AM  I spoke with Dr Morris at [facility] and she recommends 120 Cardizem daily and he can take an additional 60 mg Cardizem if needed.   12:41 PM  He had a 47 beat run of V Tach rate about 180 and is now back in SVT at 180 bpm. He did feel a bit dizzy when he had the V Tach but no chest " pain.  By the time we got to the room it was over and he felt okay.  We are giving the rest of the liter of NS as a bolus and starting the diltiazem infusion.   1:19 PM   I spoke with Dr Morris again about this patient and they have accepted him to [facility].  We will keep him on diltiazem, currently on 15 mcg/min.         I have reviewed the nursing notes.    I have reviewed the findings, diagnosis, plan and need for follow up with the patient.  Medical Decision Making  The patient's presentation was of high complexity (a chronic illness severe exacerbation, progression, or side effect of treatment).    The patient's evaluation involved:  an assessment requiring an independent historian (see separate area of note for details)  review of external note(s) from 1 sources (see separate area of note for details)  ordering and/or review of 3+ test(s) in this encounter (see separate area of note for details)  discussion of management or test interpretation with another health professional (see separate area of note for details)    The patient's management necessitated moderate risk (prescription drug management including medications given in the ED), high risk (a decision regarding hospitalization), and further care after sign-out to Dr Morris (see their note for further management).    Final diagnoses:   Paroxysmal supraventricular tachycardia   Status post catheter ablation of atrial fibrillation (9/29/2011)       1/10/2024   [facility], Jesus Krishnamurthy MD  01/11/24 0722

## 2024-01-10 NOTE — LETTER
April 12, 2024      To Whom It May Concern:    Moses Naavrro was seen in our Emergency Department on January 10, 2024. At the time of his transfer he had been having runs of V Tach at a rate of 180 bpm followed by SVT at a rate of 180 bpm. He was on a diltiazem drip.     I chose [facility] over [facility] because he has had all his previous surgical cardiac care and his follow up visits at [facility] and for this reason, continuity of cardiac care in a critically ill patient, we decided to transfer the patient back to [facility].    Although the patient was in a normal rhythm by the time they arrived at [facility], there was no way to predict this at the time of transfer.  Transfer by ground to [facility] would have taken at least three hours, likely four hours, and staff in a ground ambulance do not have the same level of training or the same access to interventions as an air ambulance.     I recognize that there is a significant difference in cost between the two but given his vital signs at time of transfer I think air transport was appropriate.     Sincerely,          Jesus Navarrete MD

## 2024-01-10 NOTE — ED NOTES
Pt went into a 47 beat run of Vta. MD notified and present at bedside. Orders acquired. Moving pt to bay 1.

## 2024-01-10 NOTE — LETTER
April 5, 2024      To Whom It May Concern:      Moses Navarro was seen in our Emergency Department on January 10, 2024. At the time of his transfer he had been having runs of V Tach at a rate of 180 bpm followed by SVT at a rate of 180 bpm. He was on a diltiazem drip.     Although the patient was in a normal rhythm by the time they arrived at [facility], there was no way to predict this at the time of transfer.  Transfer by ground to [facility] would have taken at least three hours, likely four hours, and staff in a ground ambulance do not have the same level of training or the same access to interventions as an air ambulance.     I recognize that there is a significant difference in cost between the two but given his vital signs at time of transfer I think air transport was appropriate.     Sincerely,          Jesus Navarrete MD

## 2024-01-13 LAB
ATRIAL RATE - MUSE: 100 BPM
DIASTOLIC BLOOD PRESSURE - MUSE: NORMAL MMHG
INTERPRETATION ECG - MUSE: NORMAL
P AXIS - MUSE: 65 DEGREES
PR INTERVAL - MUSE: 128 MS
QRS DURATION - MUSE: 80 MS
QT - MUSE: 342 MS
QTC - MUSE: 441 MS
R AXIS - MUSE: 49 DEGREES
SYSTOLIC BLOOD PRESSURE - MUSE: NORMAL MMHG
T AXIS - MUSE: 36 DEGREES
VENTRICULAR RATE- MUSE: 100 BPM

## 2024-02-07 ENCOUNTER — MYC MEDICAL ADVICE (OUTPATIENT)
Dept: FAMILY MEDICINE | Facility: OTHER | Age: 54
End: 2024-02-07
Payer: COMMERCIAL

## 2024-02-08 NOTE — TELEPHONE ENCOUNTER
Please call this patient back and let him know that it would be reasonable to try an injection into the joint right next to the 1 we injected.  Another option would be a consultation with orthopedic hand surgery.  Thanks.

## 2024-02-13 NOTE — TELEPHONE ENCOUNTER
Called [facility] to inquire about changing 2/22 appt with Darrell to 9am.  8:45 check in.  They did this.      Patient update on MyChart.    Claudia Almonte RN on 2/13/2024 at 8:50 AM

## 2024-02-22 ENCOUNTER — OFFICE VISIT (OUTPATIENT)
Dept: FAMILY MEDICINE | Facility: OTHER | Age: 54
End: 2024-02-22
Attending: FAMILY MEDICINE
Payer: COMMERCIAL

## 2024-02-22 VITALS
WEIGHT: 213 LBS | DIASTOLIC BLOOD PRESSURE: 80 MMHG | OXYGEN SATURATION: 94 % | BODY MASS INDEX: 29.71 KG/M2 | TEMPERATURE: 97.9 F | HEART RATE: 99 BPM | SYSTOLIC BLOOD PRESSURE: 126 MMHG | RESPIRATION RATE: 16 BRPM

## 2024-02-22 DIAGNOSIS — M18.12 ARTHRITIS OF CARPOMETACARPAL (CMC) JOINT OF LEFT THUMB: Primary | ICD-10-CM

## 2024-02-22 PROCEDURE — 20604 DRAIN/INJ JOINT/BURSA W/US: CPT | Mod: LT | Performed by: FAMILY MEDICINE

## 2024-02-22 PROCEDURE — 250N000009 HC RX 250: Performed by: FAMILY MEDICINE

## 2024-02-22 PROCEDURE — 250N000011 HC RX IP 250 OP 636: Performed by: FAMILY MEDICINE

## 2024-02-22 PROCEDURE — 99212 OFFICE O/P EST SF 10 MIN: CPT | Mod: 25 | Performed by: FAMILY MEDICINE

## 2024-02-22 RX ORDER — TRIAMCINOLONE ACETONIDE 40 MG/ML
20 INJECTION, SUSPENSION INTRA-ARTICULAR; INTRAMUSCULAR ONCE
Status: COMPLETED | OUTPATIENT
Start: 2024-02-22 | End: 2024-02-22

## 2024-02-22 RX ORDER — LIDOCAINE HYDROCHLORIDE 10 MG/ML
0.5 INJECTION, SOLUTION EPIDURAL; INFILTRATION; INTRACAUDAL; PERINEURAL ONCE
Status: COMPLETED | OUTPATIENT
Start: 2024-02-22 | End: 2024-02-22

## 2024-02-22 RX ADMIN — TRIAMCINOLONE ACETONIDE 20 MG: 40 INJECTION, SUSPENSION INTRA-ARTICULAR; INTRAMUSCULAR at 09:34

## 2024-02-22 RX ADMIN — LIDOCAINE HYDROCHLORIDE 0.5 ML: 10 INJECTION, SOLUTION INFILTRATION; PERINEURAL at 09:34

## 2024-02-22 ASSESSMENT — PAIN SCALES - GENERAL: PAINLEVEL: MILD PAIN (3)

## 2024-02-22 NOTE — PROGRESS NOTES
Sports Medicine Office Note    HPI:  53-year-old male coming in for follow-up evaluation of left wrist pain.  His pain has been present for a little over a year.  No inciting event or injury.  He he was last seen in this office on 1/4.  At that time he received an ultrasound-guided injection into the STT joint.  He reports 3 weeks of 60% symptom relief followed by return of pain to baseline levels.  His current pain is 3/10.  He characterized the pain as dull and occasionally sharp.  The pain is localized to the radial aspect of the wrist.      EXAM:  /80 (BP Location: Right arm, Patient Position: Sitting, Cuff Size: Adult Large)   Pulse 99   Temp 97.9  F (36.6  C) (Temporal)   Resp 16   Wt 96.6 kg (213 lb)   SpO2 94%   BMI 29.71 kg/m    MUSCULOSKELETAL EXAM:  LEFT HAND  Inspection:  -No gross deformity  -No bruising or swelling  -Scars:  None    Tenderness to palpation of the:  -Ulnar styloid:  Negative  -Distal radius:  Negative  -Patrick's tubercle:  Negative  -Scapholunate ligament:  Negative  -Scaphoid in anatomical snuffbox:  Negative  -1st CMC joint: Positive  -1st MCP joint:  Negative  -Metacarpals:  Negative    Range of Motion:  -Able to fully extend fingers  -Able to make full fist    Strength:  - strength:  5/5    Sensation:  -Intact to light touch in the radial, median, and ulnar nerve distributions    Motor:  -Intact AIN, PIN, and IO    Special Tests:  -1st CMC grind test: Weakly positive    Other:  -No signs of cyanosis. Normal skin temperature of the upper extremity.  -Elbow:  No gross deformity. Full range of motion.  -Right hand:  No gross deformity. No palpable tenderness. Normal strength and ROM.      IMAGING:  3/1/2023: 3 view left wrist x-ray  - No fracture, dislocation, or bony lesion.  Moderate degenerative changes of the STT joint.  Mild degenerative changes of the first CMC joint.  Calcification off of the tip of the ulnar styloid which may represent an old  injury.      ASSESSMENT/PLAN:  Diagnoses and all orders for this visit:  Arthritis of carpometacarpal (CMC) joint of left thumb  -     VT ARTHROCNT ASPIR&/INJ SMALL JT/BURSAW/US REC RPRT  -     POC US SOFT TISSUE  -     triamcinolone (KENALOG-40) injection 20 mg  -     lidocaine (PF) (XYLOCAINE) 1 % injection 0.5 mL  -     lidocaine (PF) (XYLOCAINE) 1 % injection 0.5 mL    53-year-old male with first CMC and STT arthritis involving the right wrist.  X-rays from 3/1/2023 were again personally reviewed in the office with findings as demonstrated above by my interpretation.  We discussed treatment options to include trial of an injection into the first CMC joint, surgical consultation, or MRI.  After reviewing the risks/benefits, the patient elects to proceed with an ultrasound-guided injection into the first CMC joint.  See procedure note below:    PROCEDURE:  Ultrasound Guided Injection of the Left 1st Carpometacarpal Joint      EQUIPMENT:  Yvonne Affiniti 70 with Linear L15-7io (7-15MHz) Transducer (Hockey Stick).      PROCEDURAL PAUSE:    A procedural pause was conducted to verify:  correct patient identity, procedure to be performed, and as applicable, correct side, site, and correct patient position.      INFORMED CONSENT:   I discussed the risks, possible benefits, and alternatives to injection.  Following denial of allergy and review of potential side effects and complications (including but not necessarily limited to infection, allergic reaction, fat necrosis, skin depigmentation, local tissue breakdown, systemic effects of corticosteroids, elevation of blood glucose, injury to soft tissue and/or nerves, and seizure), all questions were answered, and consent was given to proceed.  Patient verbalized understanding.      PROCEDURE DETAILS:    The use of direct ultrasound visualization of the needle (rather than a non-guided ultrasound procedure) was required to increase patient safety by excluding inadvertent  intramuscular or intratendinous placement and minimizing bleeding and injury by avoiding osteochondral and nearby neurovascular structures.  Guidance also maximizes accurate injection placement and likely clinical benefit beyond that obtained with a non-guided injection.  This allows increased diagnostic specificity when evaluating effectiveness of the injection.      Prior to the procedure, the left base of the thumb was examined with a 15MHz linear hockey stick transducer to determine the optimal needle path and visualize the radial aspect of the 1st CMC joint in a longitudinal plane relative to the metacarpals.  Following this, the skin was marked, and the base of the thumb was prepared with chlorhexidine.  Local anesthesia was obtained with 0.5mL of 1% lidocaine.  Then this area was re-examined using the same transducer, a sterile ultrasound transducer cover, sterile gloves, and sterile gel.  Under ultrasound guidance, a 1.5-inch 25-gauge needle was advanced from proximal to distal taking an in-plane approach into the 1st CMC joint.  One needle pass was performed.  After ultrasound visualization of the needle tip in the target area and negative aspiration for blood, a mixture of 0.5mL of 1% lidocaine and 0.5mL of triamcinolone (40mg/mL) was injected into the left 1st CMC joint.  0mL was wasted.  Images have been saved on the musculoskeletal ultrasound in clinic.      The patient tolerated the procedure without complication and was discharged in good condition after a short observation period.  The patient was instructed to contact me regarding any questions pertaining to the procedure.      DIAGNOSIS:    -Successful ultrasound guided injection of the left 1st carpometacarpal joint without immediate complication      PLAN:   -Post-procedure care reviewed, including avoiding submersion of the injection site for 48 hours   -Return precautions reviewed for signs/symptoms that would be concerning for infection   -The  patient was instructed to ice and take APAP this evening if needed   -Return to clinic as needed      Home Mast MD  2/22/2024  9:15 AM    Total time spent with this patient was 42 minutes which included chart review, visualization and independent interpretation of images, time spent with the patient, and documentation.    Procedure time:  30 minute(s)

## 2024-03-05 ENCOUNTER — MYC MEDICAL ADVICE (OUTPATIENT)
Dept: FAMILY MEDICINE | Facility: OTHER | Age: 54
End: 2024-03-05
Payer: COMMERCIAL

## 2024-03-05 DIAGNOSIS — M19.032 ARTHRITIS OF SCAPHOID-TRAPEZIUM-TRAPEZOID JOINT OF LEFT HAND: ICD-10-CM

## 2024-03-05 DIAGNOSIS — M18.12 ARTHRITIS OF CARPOMETACARPAL (CMC) JOINT OF LEFT THUMB: Primary | ICD-10-CM

## 2024-03-06 NOTE — TELEPHONE ENCOUNTER
Dr. Ramírez-     I have frannie'd up an ortho referral for Yonatan at [facility].  If you want to change- feel free.     Claudia Almonte RN on 3/6/2024 at 11:10 AM

## 2024-03-27 DIAGNOSIS — M25.532 LEFT WRIST PAIN: Primary | ICD-10-CM

## 2024-03-29 ENCOUNTER — HOSPITAL ENCOUNTER (OUTPATIENT)
Dept: GENERAL RADIOLOGY | Facility: OTHER | Age: 54
Discharge: HOME | End: 2024-03-29
Attending: SPECIALIST
Payer: COMMERCIAL

## 2024-03-29 ENCOUNTER — OFFICE VISIT (OUTPATIENT)
Dept: ORTHOPEDICS | Facility: OTHER | Age: 54
End: 2024-03-29
Attending: FAMILY MEDICINE
Payer: COMMERCIAL

## 2024-03-29 VITALS — OXYGEN SATURATION: 99 % | HEART RATE: 96 BPM

## 2024-03-29 DIAGNOSIS — M19.032 ARTHRITIS OF SCAPHOID-TRAPEZIUM-TRAPEZOID JOINT OF LEFT HAND: ICD-10-CM

## 2024-03-29 DIAGNOSIS — M25.532 LEFT WRIST PAIN: ICD-10-CM

## 2024-03-29 DIAGNOSIS — M18.12 ARTHRITIS OF CARPOMETACARPAL (CMC) JOINT OF LEFT THUMB: ICD-10-CM

## 2024-03-29 PROCEDURE — 73110 X-RAY EXAM OF WRIST: CPT | Mod: LT

## 2024-03-29 PROCEDURE — 99203 OFFICE O/P NEW LOW 30 MIN: CPT | Performed by: SPECIALIST

## 2024-03-29 ASSESSMENT — PAIN SCALES - GENERAL: PAINLEVEL: MILD PAIN (2)

## 2024-03-29 NOTE — PROGRESS NOTES
Surgical Clinic Consult  Primary physician:     No Ref-Primary, Physician      History of present illness:  This is a 53 year old right hand dominant male I am seeing in consultation for left basilar joint symptoms.  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  He has been having ongoing discomfort over the base of the thumb for some time.  He was injected in the basilar joint as well as the STT by Dr. Mast under ultrasound which gave him temporary relief of symptoms.  He would like to discuss proceeding with basal joint reconstruction.    Past medical history:   Past Medical History:   Diagnosis Date    Atrial fibrillation and flutter     since 01/2011    Injury     crush injury of the right hand    Injury     hx of c-spine injury    Injury of tendon of rotator cuff     No Comments Provided    Other injury of spleen, initial encounter     No Comments Provided       Pastsurgical history:  Past Surgical History:   Procedure Laterality Date    ARTHROSCOPY ANKLE, OPEN REPAIR LIGAMENT, COMBINED Right 9/3/2020    Procedure: ARTHROSCOPY, ANKLE, WITH OPEN REPAIR OF ANKLE LIGAMENT;  Surgeon: Ignacio Black DPM;  Location: [facility] OR    ARTHROSCOPY KNEE      Right knee    EP ABLATION FOCAL AFIB  09/29/2011    [facility]    FOOT SURGERY      foot and toe fractures due to accident    OTHER SURGICAL HISTORY      angiogram right hand       Current medications:  Current Outpatient Medications   Medication Sig Dispense Refill    apixaban ANTICOAGULANT (ELIQUIS) 5 MG tablet Take 5 mg by mouth      diltiazem (CARDIZEM) 60 MG tablet 1 every 60 minutes x 4 prn tachycardia (Patient not taking: Reported on 3/1/2023) 30 tablet 0       Allergies:  No Known Allergies    Family history:  Family History   Problem Relation Age of Onset    Diabetes Father         Diabetes,type II    Hypertension Father         Hypertension    Family History Negative Mother         Good Health    Family History  Negative Sister         Good Health    Heart Disease Other     Diabetes Paternal Uncle     Prostate Cancer No family hx of     Colon Cancer No family hx of        Social history:  Social History     Socioeconomic History    Marital status:      Spouse name: Not on file    Number of children: Not on file    Years of education: Not on file    Highest education level: Not on file   Occupational History    Not on file   Tobacco Use    Smoking status: Never    Smokeless tobacco: Never   Vaping Use    Vaping Use: Never used   Substance and Sexual Activity    Alcohol use: Yes     Alcohol/week: 4.0 standard drinks of alcohol     Types: 4 Cans of beer per week    Drug use: Never    Sexual activity: Yes     Partners: Female   Other Topics Concern    Not on file   Social History Narrative    .  two child.  Lives in [place].  Works at [employer] .     Social Determinants of Health     Financial Resource Strain: Not on file   Food Insecurity: Not on file   Transportation Needs: Not on file   Physical Activity: Not on file   Stress: Not on file   Social Connections: Not on file   Interpersonal Safety: Low Risk  (2/22/2024)    Interpersonal Safety     Do you feel physically and emotionally safe where you currently live?: Yes     Within the past 12 months, have you been hit, slapped, kicked or otherwise physically hurt by someone?: No     Within the past 12 months, have you been humiliated or emotionally abused in other ways by your partner or ex-partner?: No   Housing Stability: Not on file       PROBLEM LIST:  Patient Active Problem List   Diagnosis    Atrial flutter (H)    Crush injury of hand    Hematuria    Insomnia, persistent    Other dyspnea and respiratory abnormality    Status post catheter ablation of atrial fibrillation (9/29/2011)       Review of Systems:  COMPLETE 12 point REVIEW OF SYSTEMS is otherwise negative with the exception of which is stated above.    Physical exam: Pulse 96   SpO2  99%     General: this is a pleasant male patient in no acute distress.  Patient is awake alert and oriented x3 .  Well-groomed, well kempt.  EXAM:  Musculoskeletal: Examination reveals full symmetric range of motion of the elbows wrists he has significant basal joint tenderness with a positive grind maneuver on the left palmar abduction is mildly limited    Imaging: X-rays are reviewed 3 views of the wrist obtained in the office today these reveal basilar joint as well as STT joint arthrosis.    Assessment:   Basilar joint arthrosis    Plan:    Basilar joint reconstruction with Maged trapezoid ectomy risk complications benefits reviewed this can be scheduled at his convenience.      Richie Tam MD

## 2024-04-25 ENCOUNTER — OFFICE VISIT (OUTPATIENT)
Dept: FAMILY MEDICINE | Facility: OTHER | Age: 54
End: 2024-04-25
Attending: FAMILY MEDICINE
Payer: COMMERCIAL

## 2024-04-25 VITALS
HEART RATE: 104 BPM | DIASTOLIC BLOOD PRESSURE: 80 MMHG | RESPIRATION RATE: 18 BRPM | OXYGEN SATURATION: 97 % | HEIGHT: 71 IN | SYSTOLIC BLOOD PRESSURE: 136 MMHG | TEMPERATURE: 98 F | WEIGHT: 218.38 LBS | BODY MASS INDEX: 30.57 KG/M2

## 2024-04-25 DIAGNOSIS — M19.032 LOCALIZED PRIMARY OSTEOARTHRITIS OF CARPOMETACARPAL (CMC) JOINT OF LEFT WRIST: Primary | ICD-10-CM

## 2024-04-25 DIAGNOSIS — Z01.818 PRE-OP EXAM: ICD-10-CM

## 2024-04-25 PROBLEM — R31.9 HEMATURIA: Status: RESOLVED | Noted: 2017-02-03 | Resolved: 2024-04-25

## 2024-04-25 PROCEDURE — 99214 OFFICE O/P EST MOD 30 MIN: CPT | Performed by: FAMILY MEDICINE

## 2024-04-25 ASSESSMENT — PAIN SCALES - GENERAL: PAINLEVEL: NO PAIN (0)

## 2024-04-25 NOTE — NURSING NOTE
"Medication Reconciliation: Complete    Gisel Velez LPN  4/25/2024 8:02 AM  Chief Complaint   Patient presents with    Pre-Op Exam     Dr. Yonatan AYALA 05/10/24 Left wrist joint reconstruction       Initial /80 (BP Location: Right arm, Patient Position: Sitting, Cuff Size: Adult Large)   Pulse 104   Temp 98  F (36.7  C) (Tympanic)   Resp 18   Ht 1.803 m (5' 11\")   Wt 99.1 kg (218 lb 6 oz)   SpO2 97%   BMI 30.46 kg/m   Estimated body mass index is 30.46 kg/m  as calculated from the following:    Height as of this encounter: 1.803 m (5' 11\").    Weight as of this encounter: 99.1 kg (218 lb 6 oz).  Medication Review: complete    The next two questions are to help us understand your food security.  If you are feeling you need any assistance in this area, we have resources available to support you today.          4/20/2024   SDOH- Food Insecurity   Within the past 12 months, did you worry that your food would run out before you got money to buy more? N   Within the past 12 months, did the food you bought just not last and you didn t have money to get more? N         Health Care Directive:  Patient does not have a Health Care Directive or Living Will: Discussed advance care planning with patient; however, patient declined at this time.    Gisel Velez LPN      "

## 2024-04-25 NOTE — H&P (VIEW-ONLY)
Preoperative Evaluation  [facility]  [address]  [place] [address]  Phone: [phone]  Fax: [phone]  Primary Provider: No Ref-Primary, Physician  Pre-op Performing Provider: BECKY DREW  Apr 25, 2024       Ray is a 53 year old, presenting for the following:  Pre-Op Exam (Dr. Yonatan AYALA 05/10/24 Left wrist joint reconstruction)      Surgical Information  Surgery/Procedure: Left wrist joint reconstruction  Surgery Location: [facility]  Surgeon: Dr. Tam  Surgery Date: 05/10/24  Time of Surgery: TBD  Where patient plans to recover: At home with family  Fax number for surgical facility: Note does not need to be faxed, will be available electronically in Epic.    Assessment & Plan     The proposed surgical procedure is considered LOW risk.    Pre-op exam      Localized primary osteoarthritis of carpometacarpal (CMC) joint of left wrist        Possible Sleep Apnea:          - No identified additional risk factors other than previously addressed    Antiplatelet or Anticoagulation Medication Instructions   - apixaban (Eliquis), edoxaban (Savaysa), rivaroxaban (Xarelto): Bleeding risk is moderate or high for this procedure AND CrCl  (>=) 50 mL/min. HOLD 2 days before surgery.     Additional Medication Instructions  Patient is on no additional chronic medications    Recommendation  APPROVAL GIVEN to proceed with proposed procedure, without further diagnostic evaluation.          Subjective       HPI related to upcoming procedure: Left wrist joint reconstruction.  Patient reports he has been having trouble with his thumb for a number of months.  Slowly getting worse.        4/20/2024    12:07 PM   Preop Questions   1. Have you ever had a heart attack or stroke? No   2. Have you ever had surgery on your heart or blood vessels, such as a stent placement, a coronary artery bypass, or surgery on an artery in your head, neck, heart, or legs? YES -    3. Do you have chest  pain with activity? No   4. Do you have a history of  heart failure? No   5. Do you currently have a cold, bronchitis or symptoms of other infection? No   6. Do you have a cough, shortness of breath, or wheezing? No   7. Do you or anyone in your family have previous history of blood clots? YES - dad    8. Do you or does anyone in your family have a serious bleeding problem such as prolonged bleeding following surgeries or cuts? No   9. Have you ever had problems with anemia or been told to take iron pills? No   10. Have you had any abnormal blood loss such as black, tarry or bloody stools? No   11. Have you ever had a blood transfusion? No   12. Are you willing to have a blood transfusion if it is medically needed before, during, or after your surgery? Yes   13. Have you or any of your relatives ever had problems with anesthesia? UNKNOWN -    14. Do you have sleep apnea, excessive snoring or daytime drowsiness? YES -    14a. Do you have a CPAP machine? No   15. Do you have any artifical heart valves or other implanted medical devices like a pacemaker, defibrillator, or continuous glucose monitor? No   16. Do you have artificial joints? No   17. Are you allergic to latex? No       Health Care Directive  Patient does not have a Health Care Directive or Living Will: Discussed advance care planning with patient; however, patient declined at this time.    Preoperative Review of    reviewed - no record of controlled substances prescribed.          Patient Active Problem List    Diagnosis Date Noted    Status post catheter ablation of atrial fibrillation (9/29/2011) 03/01/2023     Priority: Medium    Hematuria 02/03/2017     Priority: Medium    Other dyspnea and respiratory abnormality 05/07/2012     Priority: Medium    Insomnia, persistent 10/19/2011     Priority: Medium    Crush injury of hand 03/08/2011     Priority: Medium    Atrial flutter (H) 01/25/2011     Priority: Medium      Past Medical History:    Diagnosis Date    Atrial fibrillation and flutter     since 01/2011    Injury     crush injury of the right hand    Injury     hx of c-spine injury    Injury of tendon of rotator cuff     No Comments Provided    Other injury of spleen, initial encounter     No Comments Provided     Past Surgical History:   Procedure Laterality Date    ARTHROSCOPY ANKLE, OPEN REPAIR LIGAMENT, COMBINED Right 9/3/2020    Procedure: ARTHROSCOPY, ANKLE, WITH OPEN REPAIR OF ANKLE LIGAMENT;  Surgeon: Ignacio Black DPM;  Location: [facility] OR    ARTHROSCOPY KNEE      Right knee    EP ABLATION FOCAL AFIB  09/29/2011    [facility]     FOOT SURGERY      foot and toe fractures due to accident    OTHER SURGICAL HISTORY      angiogram right hand     Current Outpatient Medications   Medication Sig Dispense Refill    apixaban ANTICOAGULANT (ELIQUIS) 5 MG tablet Take 5 mg by mouth      diltiazem (CARDIZEM) 60 MG tablet 1 every 60 minutes x 4 prn tachycardia (Patient taking differently: daily as needed 1 every 60 minutes x 4 prn tachycardia) 30 tablet 0       No Known Allergies     Social History     Tobacco Use    Smoking status: Never    Smokeless tobacco: Never   Substance Use Topics    Alcohol use: Yes     Alcohol/week: 4.0 standard drinks of alcohol     Types: 4 Cans of beer per week     Family History   Problem Relation Age of Onset    Diabetes Father         Diabetes,type II    Hypertension Father         Hypertension    Family History Negative Mother         Good Health    Family History Negative Sister         Good Health    Heart Disease Other     Diabetes Paternal Uncle     Prostate Cancer No family hx of     Colon Cancer No family hx of      History   Drug Use Unknown         Review of Systems    Review of Systems  CONSTITUTIONAL: NEGATIVE for fever, chills, change in weight  ENT/MOUTH: NEGATIVE for ear, mouth and throat problems  RESP: NEGATIVE for significant cough or SOB  CV: NEGATIVE for chest pain, palpitations or peripheral  "edema    Objective    /80 (BP Location: Right arm, Patient Position: Sitting, Cuff Size: Adult Large)   Pulse 104   Temp 98  F (36.7  C) (Tympanic)   Resp 18   Ht 1.803 m (5' 11\")   Wt 99.1 kg (218 lb 6 oz)   SpO2 97%   BMI 30.46 kg/m     Estimated body mass index is 30.46 kg/m  as calculated from the following:    Height as of this encounter: 1.803 m (5' 11\").    Weight as of this encounter: 99.1 kg (218 lb 6 oz).  Physical Exam  GENERAL: alert and no distress  NECK: no adenopathy, no asymmetry, masses, or scars  RESP: lungs clear to auscultation - no rales, rhonchi or wheezes  CV: regular rate and rhythm, normal S1 S2, no S3 or S4, no murmur, click or rub, no peripheral edema  ABDOMEN: soft, nontender, no hepatosplenomegaly, no masses and bowel sounds normal  MS: no gross musculoskeletal defects noted, no edema    Recent Labs   Lab Test 01/10/24  1110 02/02/23  1647   HGB 18.1* 16.7    211    137   POTASSIUM 3.9 4.2   CR 1.03 1.08        Diagnostics  No labs were ordered during this visit.   EKG: appears normal, NSR, normal axis, normal intervals, no acute ST/T changes c/w ischemia, no LVH by voltage criteria  01/2024    Revised Cardiac Risk Index (RCRI)  The patient has the following serious cardiovascular risks for perioperative complications:   - No serious cardiac risks = 0 points     RCRI Interpretation: 0 points: Class I (very low risk - 0.4% complication rate)         Signed Electronically by: Patrick Bell MD  Copy of this evaluation report is provided to requesting physician.         " [Takes medication as prescribed] : takes [None] : Patient does not have any barriers to medication adherence

## 2024-04-25 NOTE — PROGRESS NOTES
Preoperative Evaluation  [facility]  [address]  [place] [address]  Phone: [phone]  Fax: [phone]  Primary Provider: No Ref-Primary, Physician  Pre-op Performing Provider: BECKY DREW  Apr 25, 2024       Ray is a 53 year old, presenting for the following:  Pre-Op Exam (Dr. Yonatan AYALA 05/10/24 Left wrist joint reconstruction)      Surgical Information  Surgery/Procedure: Left wrist joint reconstruction  Surgery Location: [facility]  Surgeon: Dr. Tam  Surgery Date: 05/10/24  Time of Surgery: TBD  Where patient plans to recover: At home with family  Fax number for surgical facility: Note does not need to be faxed, will be available electronically in Epic.    Assessment & Plan     The proposed surgical procedure is considered LOW risk.    Pre-op exam      Localized primary osteoarthritis of carpometacarpal (CMC) joint of left wrist        Possible Sleep Apnea:          - No identified additional risk factors other than previously addressed    Antiplatelet or Anticoagulation Medication Instructions   - apixaban (Eliquis), edoxaban (Savaysa), rivaroxaban (Xarelto): Bleeding risk is moderate or high for this procedure AND CrCl  (>=) 50 mL/min. HOLD 2 days before surgery.     Additional Medication Instructions  Patient is on no additional chronic medications    Recommendation  APPROVAL GIVEN to proceed with proposed procedure, without further diagnostic evaluation.          Subjective       HPI related to upcoming procedure: Left wrist joint reconstruction.  Patient reports he has been having trouble with his thumb for a number of months.  Slowly getting worse.        4/20/2024    12:07 PM   Preop Questions   1. Have you ever had a heart attack or stroke? No   2. Have you ever had surgery on your heart or blood vessels, such as a stent placement, a coronary artery bypass, or surgery on an artery in your head, neck, heart, or legs? YES -    3. Do you have chest  pain with activity? No   4. Do you have a history of  heart failure? No   5. Do you currently have a cold, bronchitis or symptoms of other infection? No   6. Do you have a cough, shortness of breath, or wheezing? No   7. Do you or anyone in your family have previous history of blood clots? YES - dad    8. Do you or does anyone in your family have a serious bleeding problem such as prolonged bleeding following surgeries or cuts? No   9. Have you ever had problems with anemia or been told to take iron pills? No   10. Have you had any abnormal blood loss such as black, tarry or bloody stools? No   11. Have you ever had a blood transfusion? No   12. Are you willing to have a blood transfusion if it is medically needed before, during, or after your surgery? Yes   13. Have you or any of your relatives ever had problems with anesthesia? UNKNOWN -    14. Do you have sleep apnea, excessive snoring or daytime drowsiness? YES -    14a. Do you have a CPAP machine? No   15. Do you have any artifical heart valves or other implanted medical devices like a pacemaker, defibrillator, or continuous glucose monitor? No   16. Do you have artificial joints? No   17. Are you allergic to latex? No       Health Care Directive  Patient does not have a Health Care Directive or Living Will: Discussed advance care planning with patient; however, patient declined at this time.    Preoperative Review of    reviewed - no record of controlled substances prescribed.          Patient Active Problem List    Diagnosis Date Noted    Status post catheter ablation of atrial fibrillation (9/29/2011) 03/01/2023     Priority: Medium    Hematuria 02/03/2017     Priority: Medium    Other dyspnea and respiratory abnormality 05/07/2012     Priority: Medium    Insomnia, persistent 10/19/2011     Priority: Medium    Crush injury of hand 03/08/2011     Priority: Medium    Atrial flutter (H) 01/25/2011     Priority: Medium      Past Medical History:    Diagnosis Date    Atrial fibrillation and flutter     since 01/2011    Injury     crush injury of the right hand    Injury     hx of c-spine injury    Injury of tendon of rotator cuff     No Comments Provided    Other injury of spleen, initial encounter     No Comments Provided     Past Surgical History:   Procedure Laterality Date    ARTHROSCOPY ANKLE, OPEN REPAIR LIGAMENT, COMBINED Right 9/3/2020    Procedure: ARTHROSCOPY, ANKLE, WITH OPEN REPAIR OF ANKLE LIGAMENT;  Surgeon: Ignacio Black DPM;  Location: [facility] OR    ARTHROSCOPY KNEE      Right knee    EP ABLATION FOCAL AFIB  09/29/2011    [facility]     FOOT SURGERY      foot and toe fractures due to accident    OTHER SURGICAL HISTORY      angiogram right hand     Current Outpatient Medications   Medication Sig Dispense Refill    apixaban ANTICOAGULANT (ELIQUIS) 5 MG tablet Take 5 mg by mouth      diltiazem (CARDIZEM) 60 MG tablet 1 every 60 minutes x 4 prn tachycardia (Patient taking differently: daily as needed 1 every 60 minutes x 4 prn tachycardia) 30 tablet 0       No Known Allergies     Social History     Tobacco Use    Smoking status: Never    Smokeless tobacco: Never   Substance Use Topics    Alcohol use: Yes     Alcohol/week: 4.0 standard drinks of alcohol     Types: 4 Cans of beer per week     Family History   Problem Relation Age of Onset    Diabetes Father         Diabetes,type II    Hypertension Father         Hypertension    Family History Negative Mother         Good Health    Family History Negative Sister         Good Health    Heart Disease Other     Diabetes Paternal Uncle     Prostate Cancer No family hx of     Colon Cancer No family hx of      History   Drug Use Unknown         Review of Systems    Review of Systems  CONSTITUTIONAL: NEGATIVE for fever, chills, change in weight  ENT/MOUTH: NEGATIVE for ear, mouth and throat problems  RESP: NEGATIVE for significant cough or SOB  CV: NEGATIVE for chest pain, palpitations or peripheral  "edema    Objective    /80 (BP Location: Right arm, Patient Position: Sitting, Cuff Size: Adult Large)   Pulse 104   Temp 98  F (36.7  C) (Tympanic)   Resp 18   Ht 1.803 m (5' 11\")   Wt 99.1 kg (218 lb 6 oz)   SpO2 97%   BMI 30.46 kg/m     Estimated body mass index is 30.46 kg/m  as calculated from the following:    Height as of this encounter: 1.803 m (5' 11\").    Weight as of this encounter: 99.1 kg (218 lb 6 oz).  Physical Exam  GENERAL: alert and no distress  NECK: no adenopathy, no asymmetry, masses, or scars  RESP: lungs clear to auscultation - no rales, rhonchi or wheezes  CV: regular rate and rhythm, normal S1 S2, no S3 or S4, no murmur, click or rub, no peripheral edema  ABDOMEN: soft, nontender, no hepatosplenomegaly, no masses and bowel sounds normal  MS: no gross musculoskeletal defects noted, no edema    Recent Labs   Lab Test 01/10/24  1110 02/02/23  1647   HGB 18.1* 16.7    211    137   POTASSIUM 3.9 4.2   CR 1.03 1.08        Diagnostics  No labs were ordered during this visit.   EKG: appears normal, NSR, normal axis, normal intervals, no acute ST/T changes c/w ischemia, no LVH by voltage criteria  01/2024    Revised Cardiac Risk Index (RCRI)  The patient has the following serious cardiovascular risks for perioperative complications:   - No serious cardiac risks = 0 points     RCRI Interpretation: 0 points: Class I (very low risk - 0.4% complication rate)         Signed Electronically by: Patrick Bell MD  Copy of this evaluation report is provided to requesting physician.         "

## 2024-05-09 ENCOUNTER — ANESTHESIA EVENT (OUTPATIENT)
Dept: SURGERY | Facility: OTHER | Age: 54
End: 2024-05-09
Payer: COMMERCIAL

## 2024-05-10 ENCOUNTER — HOSPITAL ENCOUNTER (OUTPATIENT)
Facility: OTHER | Age: 54
Discharge: HOME | End: 2024-05-10
Attending: SPECIALIST | Admitting: SPECIALIST
Payer: COMMERCIAL

## 2024-05-10 ENCOUNTER — ANESTHESIA (OUTPATIENT)
Dept: SURGERY | Facility: OTHER | Age: 54
End: 2024-05-10
Payer: COMMERCIAL

## 2024-05-10 VITALS
WEIGHT: 218 LBS | SYSTOLIC BLOOD PRESSURE: 120 MMHG | OXYGEN SATURATION: 94 % | BODY MASS INDEX: 30.4 KG/M2 | HEART RATE: 92 BPM | TEMPERATURE: 97.2 F | DIASTOLIC BLOOD PRESSURE: 82 MMHG | RESPIRATION RATE: 12 BRPM

## 2024-05-10 DIAGNOSIS — M19.032 CMC DJD(CARPOMETACARPAL DEGENERATIVE JOINT DISEASE), LOCALIZED PRIMARY, LEFT: Primary | ICD-10-CM

## 2024-05-10 LAB — GLUCOSE BLDC GLUCOMTR-MCNC: 108 MG/DL (ref 70–99)

## 2024-05-10 PROCEDURE — 250N000011 HC RX IP 250 OP 636: Performed by: NURSE ANESTHETIST, CERTIFIED REGISTERED

## 2024-05-10 PROCEDURE — 250N000011 HC RX IP 250 OP 636: Performed by: SPECIALIST

## 2024-05-10 PROCEDURE — 710N000009 HC RECOVERY PHASE 1, LEVEL 1, PER MIN: Performed by: SPECIALIST

## 2024-05-10 PROCEDURE — 272N000001 HC OR GENERAL SUPPLY STERILE: Performed by: SPECIALIST

## 2024-05-10 PROCEDURE — 250N000009 HC RX 250: Performed by: NURSE ANESTHETIST, CERTIFIED REGISTERED

## 2024-05-10 PROCEDURE — 250N000009 HC RX 250: Performed by: SPECIALIST

## 2024-05-10 PROCEDURE — 999N000141 HC STATISTIC PRE-PROCEDURE NURSING ASSESSMENT: Performed by: SPECIALIST

## 2024-05-10 PROCEDURE — 250N000025 HC SEVOFLURANE, PER MIN: Performed by: SPECIALIST

## 2024-05-10 PROCEDURE — 258N000003 HC RX IP 258 OP 636: Performed by: NURSE ANESTHETIST, CERTIFIED REGISTERED

## 2024-05-10 PROCEDURE — 25310 TRANSPLANT FOREARM TENDON: CPT | Mod: LT | Performed by: SPECIALIST

## 2024-05-10 PROCEDURE — 710N000012 HC RECOVERY PHASE 2, PER MINUTE: Performed by: SPECIALIST

## 2024-05-10 PROCEDURE — 64415 NJX AA&/STRD BRCH PLXS IMG: CPT | Mod: LT | Performed by: NURSE ANESTHETIST, CERTIFIED REGISTERED

## 2024-05-10 PROCEDURE — 25447 ARTHRP NTRCRP/CRP/MTCR NTRPS: CPT | Performed by: NURSE ANESTHETIST, CERTIFIED REGISTERED

## 2024-05-10 PROCEDURE — 370N000017 HC ANESTHESIA TECHNICAL FEE, PER MIN: Performed by: SPECIALIST

## 2024-05-10 PROCEDURE — 25447 ARTHRP NTRCRP/CRP/MTCR NTRPS: CPT | Mod: LT | Performed by: SPECIALIST

## 2024-05-10 PROCEDURE — 360N000077 HC SURGERY LEVEL 4, PER MIN: Performed by: SPECIALIST

## 2024-05-10 PROCEDURE — C9290 INJ, BUPIVACAINE LIPOSOME: HCPCS | Performed by: NURSE ANESTHETIST, CERTIFIED REGISTERED

## 2024-05-10 PROCEDURE — 82962 GLUCOSE BLOOD TEST: CPT

## 2024-05-10 RX ORDER — MAGNESIUM HYDROXIDE 1200 MG/15ML
LIQUID ORAL PRN
Status: DISCONTINUED | OUTPATIENT
Start: 2024-05-10 | End: 2024-05-10 | Stop reason: HOSPADM

## 2024-05-10 RX ORDER — CEFAZOLIN SODIUM/WATER 2 G/20 ML
2 SYRINGE (ML) INTRAVENOUS ONCE
Status: COMPLETED | OUTPATIENT
Start: 2024-05-10 | End: 2024-05-10

## 2024-05-10 RX ORDER — LIDOCAINE HYDROCHLORIDE 20 MG/ML
INJECTION, SOLUTION INFILTRATION; PERINEURAL PRN
Status: DISCONTINUED | OUTPATIENT
Start: 2024-05-10 | End: 2024-05-10

## 2024-05-10 RX ORDER — DEXAMETHASONE SODIUM PHOSPHATE 10 MG/ML
4 INJECTION, SOLUTION INTRAMUSCULAR; INTRAVENOUS
Status: DISCONTINUED | OUTPATIENT
Start: 2024-05-10 | End: 2024-05-10 | Stop reason: HOSPADM

## 2024-05-10 RX ORDER — PROPOFOL 10 MG/ML
INJECTION, EMULSION INTRAVENOUS PRN
Status: DISCONTINUED | OUTPATIENT
Start: 2024-05-10 | End: 2024-05-10

## 2024-05-10 RX ORDER — SODIUM CHLORIDE, SODIUM LACTATE, POTASSIUM CHLORIDE, CALCIUM CHLORIDE 600; 310; 30; 20 MG/100ML; MG/100ML; MG/100ML; MG/100ML
INJECTION, SOLUTION INTRAVENOUS CONTINUOUS
Status: DISCONTINUED | OUTPATIENT
Start: 2024-05-10 | End: 2024-05-10 | Stop reason: HOSPADM

## 2024-05-10 RX ORDER — FENTANYL CITRATE 50 UG/ML
INJECTION, SOLUTION INTRAMUSCULAR; INTRAVENOUS PRN
Status: DISCONTINUED | OUTPATIENT
Start: 2024-05-10 | End: 2024-05-10

## 2024-05-10 RX ORDER — FENTANYL CITRATE 50 UG/ML
50 INJECTION, SOLUTION INTRAMUSCULAR; INTRAVENOUS EVERY 5 MIN PRN
Status: DISCONTINUED | OUTPATIENT
Start: 2024-05-10 | End: 2024-05-10 | Stop reason: HOSPADM

## 2024-05-10 RX ORDER — LIDOCAINE 40 MG/G
CREAM TOPICAL
Status: DISCONTINUED | OUTPATIENT
Start: 2024-05-10 | End: 2024-05-10 | Stop reason: HOSPADM

## 2024-05-10 RX ORDER — ONDANSETRON 2 MG/ML
4 INJECTION INTRAMUSCULAR; INTRAVENOUS EVERY 30 MIN PRN
Status: DISCONTINUED | OUTPATIENT
Start: 2024-05-10 | End: 2024-05-10 | Stop reason: HOSPADM

## 2024-05-10 RX ORDER — FENTANYL CITRATE 50 UG/ML
25 INJECTION, SOLUTION INTRAMUSCULAR; INTRAVENOUS EVERY 5 MIN PRN
Status: DISCONTINUED | OUTPATIENT
Start: 2024-05-10 | End: 2024-05-10 | Stop reason: HOSPADM

## 2024-05-10 RX ORDER — HYDROMORPHONE HCL IN WATER/PF 6 MG/30 ML
0.2 PATIENT CONTROLLED ANALGESIA SYRINGE INTRAVENOUS EVERY 5 MIN PRN
Status: DISCONTINUED | OUTPATIENT
Start: 2024-05-10 | End: 2024-05-10 | Stop reason: HOSPADM

## 2024-05-10 RX ORDER — BUPIVACAINE HYDROCHLORIDE 5 MG/ML
INJECTION, SOLUTION EPIDURAL; INTRACAUDAL PRN
Status: DISCONTINUED | OUTPATIENT
Start: 2024-05-10 | End: 2024-05-10

## 2024-05-10 RX ORDER — NALOXONE HYDROCHLORIDE 0.4 MG/ML
0.1 INJECTION, SOLUTION INTRAMUSCULAR; INTRAVENOUS; SUBCUTANEOUS
Status: DISCONTINUED | OUTPATIENT
Start: 2024-05-10 | End: 2024-05-10 | Stop reason: HOSPADM

## 2024-05-10 RX ORDER — HYDROCODONE BITARTRATE AND ACETAMINOPHEN 5; 325 MG/1; MG/1
1-2 TABLET ORAL EVERY 4 HOURS PRN
Qty: 20 TABLET | Refills: 0 | Status: SHIPPED | OUTPATIENT
Start: 2024-05-10

## 2024-05-10 RX ORDER — HYDROMORPHONE HCL IN WATER/PF 6 MG/30 ML
0.4 PATIENT CONTROLLED ANALGESIA SYRINGE INTRAVENOUS EVERY 5 MIN PRN
Status: DISCONTINUED | OUTPATIENT
Start: 2024-05-10 | End: 2024-05-10 | Stop reason: HOSPADM

## 2024-05-10 RX ORDER — ONDANSETRON 2 MG/ML
INJECTION INTRAMUSCULAR; INTRAVENOUS PRN
Status: DISCONTINUED | OUTPATIENT
Start: 2024-05-10 | End: 2024-05-10

## 2024-05-10 RX ORDER — ONDANSETRON 4 MG/1
4 TABLET, ORALLY DISINTEGRATING ORAL EVERY 30 MIN PRN
Status: DISCONTINUED | OUTPATIENT
Start: 2024-05-10 | End: 2024-05-10 | Stop reason: HOSPADM

## 2024-05-10 RX ADMIN — BUPIVACAINE 10 ML: 13.3 INJECTION, SUSPENSION, LIPOSOMAL INFILTRATION at 09:10

## 2024-05-10 RX ADMIN — PROPOFOL 200 MG: 10 INJECTION, EMULSION INTRAVENOUS at 09:23

## 2024-05-10 RX ADMIN — ONDANSETRON HYDROCHLORIDE 4 MG: 2 SOLUTION INTRAMUSCULAR; INTRAVENOUS at 09:23

## 2024-05-10 RX ADMIN — MIDAZOLAM HYDROCHLORIDE 2 MG: 1 INJECTION, SOLUTION INTRAMUSCULAR; INTRAVENOUS at 09:21

## 2024-05-10 RX ADMIN — BUPIVACAINE HYDROCHLORIDE 20 ML: 5 INJECTION, SOLUTION EPIDURAL; INTRACAUDAL; PERINEURAL at 09:10

## 2024-05-10 RX ADMIN — FENTANYL CITRATE 50 MCG: 50 INJECTION INTRAMUSCULAR; INTRAVENOUS at 10:16

## 2024-05-10 RX ADMIN — LIDOCAINE HYDROCHLORIDE 40 MG: 20 INJECTION, SOLUTION INFILTRATION; PERINEURAL at 09:23

## 2024-05-10 RX ADMIN — MIDAZOLAM 4 MG: 1 INJECTION INTRAMUSCULAR; INTRAVENOUS at 09:05

## 2024-05-10 RX ADMIN — Medication 2 G: at 09:13

## 2024-05-10 RX ADMIN — SODIUM CHLORIDE, POTASSIUM CHLORIDE, SODIUM LACTATE AND CALCIUM CHLORIDE: 600; 310; 30; 20 INJECTION, SOLUTION INTRAVENOUS at 08:52

## 2024-05-10 RX ADMIN — FENTANYL CITRATE 50 MCG: 50 INJECTION INTRAMUSCULAR; INTRAVENOUS at 09:44

## 2024-05-10 ASSESSMENT — ACTIVITIES OF DAILY LIVING (ADL)
ADLS_ACUITY_SCORE: 29

## 2024-05-10 ASSESSMENT — ENCOUNTER SYMPTOMS: DYSRHYTHMIAS: 1

## 2024-05-10 NOTE — ANESTHESIA CARE TRANSFER NOTE
Patient: Moses Navarro    Procedure: Procedure(s):  Left Basilar Joint Reconstruction with Maged-Trapezoidectomy       Diagnosis: Arthrosis [M19.90]  Osteoarthritis of carpometacarpal joint of left thumb [M18.12]  Arthritis of uwsnzyvz-qooghpwqu-rniqirgcx joint of left hand [M19.032]  Diagnosis Additional Information: No value filed.    Anesthesia Type:   General     Note:    Oropharynx: oropharynx clear of all foreign objects  Level of Consciousness: awake  Oxygen Supplementation: room air    Independent Airway: airway patency satisfactory and stable  Dentition: dentition unchanged  Vital Signs Stable: post-procedure vital signs reviewed and stable  Report to RN Given: handoff report given  Patient transferred to: PACU    Handoff Report: Identifed the Patient, Identified the Reponsible Provider, Reviewed the pertinent medical history, Discussed the surgical course, Reviewed Intra-OP anesthesia mangement and issues during anesthesia, Set expectations for post-procedure period and Allowed opportunity for questions and acknowledgement of understanding      Vitals:  Vitals Value Taken Time   BP     Temp     Pulse     Resp     SpO2         Electronically Signed By: CHARLES MOTT CRNA  May 10, 2024  10:37 AM

## 2024-05-10 NOTE — PROGRESS NOTES
PACU Transfer Note    Moses Navarro was transferred to [facility] via cart.  Equipment used for transport:  This RN no equip.  Accompanied by:  RN  Prescriptions were: sent to [facility]    PACU Respiratory Event Documentation     1) Episodes of Apnea greater than or equal to 10 seconds: no      2) Bradypnea - less than 8 breaths per minute: no    3) Pain score on 0 to 10 scale: 0/10    4) Pain-sedation mismatch (yes or no): no    5) Repeated 02 desaturation less than 90% (yes or no): no    Anesthesia notified? (yes or no): no    Any of the above events occuring repeatedly in separate 30 minute intervals may be considered recurrent PACU respiratory events.    Patient stable and meets phase 1 discharge criteria for transport from PACU.

## 2024-05-10 NOTE — ANESTHESIA PREPROCEDURE EVALUATION
Anesthesia Pre-Procedure Evaluation    Patient: Moses Navarro   MRN: [id number] : 1970        Procedure : Procedure(s):  Left Basilar Joint Reconstruction with Maged-Trapezoidectomy          Past Medical History:   Diagnosis Date    Atrial fibrillation and flutter     since 2011    Injury     crush injury of the right hand    Injury     hx of c-spine injury    Injury of tendon of rotator cuff     No Comments Provided    Other injury of spleen, initial encounter     No Comments Provided      Past Surgical History:   Procedure Laterality Date    ARTHROSCOPY ANKLE, OPEN REPAIR LIGAMENT, COMBINED Right 9/3/2020    Procedure: ARTHROSCOPY, ANKLE, WITH OPEN REPAIR OF ANKLE LIGAMENT;  Surgeon: Ignacio Black DPM;  Location: [facility] OR    ARTHROSCOPY KNEE      Right knee    EP ABLATION FOCAL AFIB  2011    [facility]    FOOT SURGERY      foot and toe fractures due to accident    OTHER SURGICAL HISTORY      angiogram right hand      No Known Allergies   Social History     Tobacco Use    Smoking status: Never    Smokeless tobacco: Never   Substance Use Topics    Alcohol use: Yes     Alcohol/week: 4.0 standard drinks of alcohol     Types: 4 Cans of beer per week     Comment: occasional      Wt Readings from Last 1 Encounters:   05/10/24 98.9 kg (218 lb)        Anesthesia Evaluation   Pt has had prior anesthetic.     No history of anesthetic complications       ROS/MED HX  ENT/Pulmonary:  - neg pulmonary ROS     Neurologic:  - neg neurologic ROS     Cardiovascular: Comment: S/p ablation for a-fib    (+)  - -   -  - -                        dysrhythmias, a-fib and a-flutter,             METS/Exercise Tolerance: >4 METS    Hematologic:  - neg hematologic  ROS     Musculoskeletal: Comment: Hx crush injury to hand  OA of carponetacarpal joint left wrist   (+)  arthritis,             GI/Hepatic:  - neg GI/hepatic ROS     Renal/Genitourinary:  - neg Renal ROS     Endo:  - neg endo ROS     Psychiatric/Substance Use:  - neg  "psychiatric ROS     Infectious Disease:  - neg infectious disease ROS     Malignancy:  - neg malignancy ROS     Other:  - neg other ROS          Physical Exam    Airway        Mallampati: II   TM distance: > 3 FB   Neck ROM: full   Mouth opening: > 3 cm    Respiratory Devices and Support         Dental       (+) Completely normal teeth      Cardiovascular   cardiovascular exam normal       Rhythm and rate: regular and normal     Pulmonary   pulmonary exam normal        breath sounds clear to auscultation           OUTSIDE LABS:  CBC:   Lab Results   Component Value Date    WBC 12.0 (H) 01/10/2024    WBC 9.6 02/02/2023    HGB 18.1 (H) 01/10/2024    HGB 16.7 02/02/2023    HCT 49.2 01/10/2024    HCT 47.8 02/02/2023     01/10/2024     02/02/2023     BMP:   Lab Results   Component Value Date     01/10/2024     02/02/2023    POTASSIUM 3.9 01/10/2024    POTASSIUM 4.2 02/02/2023    CHLORIDE 102 01/10/2024    CHLORIDE 99 02/02/2023    CO2 27 01/10/2024    CO2 29 02/02/2023    BUN 15.9 01/10/2024    BUN 18.7 02/02/2023    CR 1.03 01/10/2024    CR 1.08 02/02/2023     (H) 01/10/2024     (H) 02/02/2023     COAGS: No results found for: \"PTT\", \"INR\", \"FIBR\"  POC: No results found for: \"BGM\", \"HCG\", \"HCGS\"  HEPATIC:   Lab Results   Component Value Date    ALBUMIN 4.6 02/02/2023    PROTTOTAL 7.6 02/02/2023    ALT 53 (H) 02/02/2023    AST 35 02/02/2023    ALKPHOS 51 02/02/2023    BILITOTAL 1.3 (H) 02/02/2023     OTHER:   Lab Results   Component Value Date    A1C 5.4 08/12/2020    GEE 9.2 01/10/2024    MAG 2.1 02/02/2023    TSH 2.18 02/02/2023       Anesthesia Plan    ASA Status:  2    NPO Status:  NPO Appropriate    Anesthesia Type: General.     - Airway: LMA   Induction: Propofol.   Maintenance: Balanced.        Consents    Anesthesia Plan(s) and associated risks, benefits, and realistic alternatives discussed. Questions answered and patient/representative(s) expressed understanding.     - " "Discussed: Risks, Benefits and Alternatives for BOTH SEDATION and the PROCEDURE were discussed     - Discussed with:  Patient      - Extended Intubation/Ventilatory Support Discussed: No.      - Patient is DNR/DNI Status: No     Use of blood products discussed: No .     Postoperative Care    Pain management: Peripheral nerve block (Single Shot).   PONV prophylaxis: Ondansetron (or other 5HT-3)     Comments:               CHARLES Solorio CRNA    I have reviewed the pertinent notes and labs in the chart from the past 30 days and (re)examined the patient.  Any updates or changes from those notes are reflected in this note.            # Drug Induced Coagulation Defect: home medication list includes an anticoagulant medication   # Obesity: Estimated body mass index is 30.4 kg/m  as calculated from the following:    Height as of 4/25/24: 1.803 m (5' 11\").    Weight as of this encounter: 98.9 kg (218 lb).      "

## 2024-05-10 NOTE — INTERVAL H&P NOTE
"  The History and Physical has been reviewed, the patient has been examined and no changes have occurred in the patient's condition since the H & P was completed.         Clinical Conditions Present on Arrival:  Clinically Significant Risk Factors Present on Admission                # Drug Induced Coagulation Defect: home medication list includes an anticoagulant medication   # Obesity: Estimated body mass index is 30.46 kg/m  as calculated from the following:    Height as of 4/25/24: 1.803 m (5' 11\").    Weight as of 4/25/24: 99.1 kg (218 lb 6 oz).       "

## 2024-05-10 NOTE — OP NOTE
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  He failed nonoperative treatment was offered basal joint reconstruction.    Procedure: Patient brought the op room placed the operating table patient undergone a block in the preoperative holding area for postop pain control.  Patient was placed under general anesthesia pneumatic tourniquet was placed about the left upper extremity the left upper extremity was prepped draped in normal sterile manner.  Timeout procedure was performed confirming surgical site patient and the procedure.  Left upper extremities and elevated exsanguinated and the tourniquet was inflated to 250 mL of mercury.  A vertical incision was planned over the thumb MCP joint carried sharply down through skin through subcutaneous tissue.  Careful spreading was performed to avoid injury to cutaneous branches of the superficial radial nerve.  The interval between the extensor pollicis brevis and abductor pollicis longus was identified and a capsulotomy was performed exposing the significantly arthritic trapeziometacarpal joint.  The trapezium was fractured and removed piecemeal with care taken to avoid injury to the FCR tendon.  A small saw was then used to perform a Maged trapezoid ectomy with with retractors placed to avoid injury to the FCR.  The FCR tendon graft was then harvested using the 2 wire technique.  This was then passed into the pseudo space.  Drill hole was fashioned parallel to the nail plate along the base of the thumb metacarpal.   Tendon graft was passed without difficulty sewn back upon itself and the suture was prepared in standard fashion.  This was placed in the pseudo space.  The thumb was then irrigated the capsule was closed with interrupted 2-0 Ethibond suture.  Tendon transfer was then performed extensor pollicis brevis to abductor pollicis longus.  Wounds were all irrigated and skin was closed with interrupted 4 nylon suture a thumb spica splint was applied and the patient was brought to the recovery room in stable condition.

## 2024-05-10 NOTE — ANESTHESIA POSTPROCEDURE EVALUATION
Patient: Moses Navarro    Procedure: Procedure(s):  Left Basilar Joint Reconstruction with Maged-Trapezoidectomy       Anesthesia Type:  General    Note:  Disposition: Outpatient   Postop Pain Control: Uneventful            Sign Out: Well controlled pain   PONV: No   Neuro/Psych: Uneventful            Sign Out: Acceptable/Baseline neuro status   Airway/Respiratory: Uneventful            Sign Out: Acceptable/Baseline resp. status   CV/Hemodynamics: Uneventful            Sign Out: Acceptable CV status; No obvious hypovolemia; No obvious fluid overload   Other NRE: NONE   DID A NON-ROUTINE EVENT OCCUR? No           Last vitals:  Vitals Value Taken Time   /94 05/10/24 1045   Temp 97.2  F (36.2  C) 05/10/24 1035   Pulse 87 05/10/24 1045   Resp 11 05/10/24 1046   SpO2 96 % 05/10/24 1047   Vitals shown include unfiled device data.    Electronically Signed By: CHARLES MOTT CRNA  May 10, 2024  1:24 PM

## 2024-05-10 NOTE — ANESTHESIA PROCEDURE NOTES
Brachial plexus Procedure Note    Pre-Procedure   Staff -        CRNA: Turner Charles APRN CRNA       Performed By: CRNA       Location: pre-op       Procedure Start/Stop Times: 5/10/2024 9:05 AM and 5/10/2024 9:10 AM       Pre-Anesthestic Checklist: patient identified, IV checked, site marked, risks and benefits discussed, informed consent, monitors and equipment checked, pre-op evaluation, at physician/surgeon's request and post-op pain management  Timeout:       Correct Patient: Yes        Correct Procedure: Yes        Correct Site: Yes        Correct Position: Yes        Correct Laterality: Yes        Site Marked: Yes  Procedure Documentation  Procedure: Brachial plexus       Diagnosis: POST OPERATIVE PAIN CONTROL       Laterality: left       Patient Position: supine       Patient Prep/Sterile Barriers: sterile gloves, mask       Skin prep: Chloraprep       Local skin infiltrated with 0.5 mL of. Local skin infiltration: bupivacaine 0.5% (infra-clavicular approach).       Needle Type: insulated and short bevel       Needle Gauge: 21.        Needle Length (Inches): 4        Ultrasound guided       1. Ultrasound was used to identify targeted nerve, plexus, vascular marker, or fascial plane and place a needle adjacent to it in real-time.       2. Ultrasound was used to visualize the spread of anesthetic in close proximity to the above referenced structure.       3. A permanent image is entered into the patient's record.       4. The visualized anatomic structures appeared normal.       5. There were no apparent abnormal pathologic findings.    Assessment/Narrative         The placement was negative for: blood aspirated, painful injection and site bleeding       Paresthesias: No.       Bolus given via needle..        Secured via.        Insertion/Infusion Method: Single Shot       Complications: none       Injection made incrementally with aspirations every 5 mL.    Medication(s) Administered   Medication  "Administration Time: 5/10/2024 9:05 AM     Comments:  Pt tolerated procedure well.  No complications encountered.       FOR [facility] ONLY:   Pain Team Contact information: please page the Pain Team Via [employer]. Search \"Pain\". During daytime hours, please page the attending first. At night please page the resident first.      "

## 2024-05-10 NOTE — DISCHARGE INSTRUCTIONS
[facility]  Adult Discharge Orders & Instructions      For 24 hours after surgery:  Get plenty of rest.  A responsible adult must stay with you for at least 24 hours after you leave the hospital.   You may feel lightheaded.  IF so, sit for a few minutes before standing.  Have someone help you get up.   You may have a slight fever. Call the doctor if your fever is over 101 F (38.3 C) (taken under the tongue) or lasts longer than 24 hours.  You may have a dry mouth, a sore throat, muscle aches or trouble sleeping.  These should go away after 24 hours.  Do not make important or legal decisions.  6.   Do not drive or use heavy equipment.  If you have weakness or tingling, don't drive or use heavy equipment until this feeling goes away.                                                                                                                                                                         To contact a doctor, call    [phone]______________

## 2024-05-10 NOTE — PROGRESS NOTES
Pt. has been discharged to home at 1200  via wheelchair accompanied by RN and pt.'s wife.    Written discharge instructions were provided to pt.  Prescriptions were sent to [facility].  Patient states their pain is 0/10, and denies any nausea or dizziness upon discharge.  Pt. Tolerating food/fluids, activity in room.    Patient and adult caring for them verbalize understanding of discharge instructions including no driving until tomorrow and no longer taking narcotic pain medications - no operating mechanical equipment and no making any important decisions.They understand reason for discharge, and necessary follow-up appointments.

## 2024-05-10 NOTE — ANESTHESIA PROCEDURE NOTES
Airway       Patient location during procedure: OR       Procedure Start/Stop Times: 5/10/2024 9:24 AM  Staff -        CRNA: Yahaira Sheehan APRN CRNA       Performed By: CRNA  Consent for Airway        Urgency: elective  Indications and Patient Condition       Indications for airway management: jose-procedural       Induction type:intravenous       Mask difficulty assessment: 0 - not attempted    Final Airway Details       Final airway type: supraglottic airway    Supraglottic Airway Details        Type: LMA       Brand: I-Gel       LMA size: 4    Post intubation assessment        Placement verified by: capnometry, equal breath sounds and chest rise        Number of attempts at approach: 1       Number of other approaches attempted: 0       Secured with: tape       Ease of procedure: easy       Dentition: Intact and Unchanged    Medication(s) Administered   Medication Administration Time: 5/10/2024 9:24 AM

## 2024-05-10 NOTE — BRIEF OP NOTE
[facility]    Brief Operative Note    Pre-operative diagnosis: Arthrosis [M19.90]  Osteoarthritis of carpometacarpal joint of left thumb [M18.12]  Arthritis of ksurltgt-smiyhmghd-xihvrbvfz joint of left hand [M19.032]  Post-operative diagnosis Same as pre-operative diagnosis    Procedure: Left Basilar Joint Reconstruction with Maged-Trapezoidectomy, Left - Wrist    Surgeon: Surgeons and Role:     * Richie Tam MD - Primary     * Socrates Bunch PA - Assisting  Anesthesia: General with Block   Estimated Blood Loss: None    Drains: None  Specimens: * No specimens in log *  Findings:   None.  Complications: None.  Implants: * No implants in log *

## 2024-05-15 ENCOUNTER — THERAPY VISIT (OUTPATIENT)
Dept: OCCUPATIONAL THERAPY | Facility: OTHER | Age: 54
End: 2024-05-15
Attending: SPECIALIST
Payer: COMMERCIAL

## 2024-05-15 DIAGNOSIS — M18.9 OSTEOARTHRITIS OF BASILAR JOINT OF THUMB: ICD-10-CM

## 2024-05-15 PROCEDURE — 97760 ORTHOTIC MGMT&TRAING 1ST ENC: CPT | Mod: GO

## 2024-05-15 NOTE — PROGRESS NOTES
Splint only visit    05/15/24 0500   Appointment Info   Treating Provider Ana Saenz, OTR/L   Visits Used 1   Medical Diagnosis Left basilar joint arthroplasty   OT Tx Diagnosis Thumn pain, weakness, and limited ROM.   Other pertinent information Splint only visit   Subjective Report   Subjective Report Patient reports his thumb has felt fine until the block wore off yesterday.   Orthotics   Orthotic Assessment, Initial session minutes (68039) 40   Type of Orthotic Forearm based thumb spica   Wear Schedule All the time. May remove for hygiene and skin checks.   Skilled Intervention Fabricated appropriate splint, educated in wear and care.   Patient Response/Progress Fit well   Wear Schedule Full time   Off for: Hygiene   Education   Learner/Method Patient   Plan   Plan for next session Follow-up for eval after MD appointment   Total Session Time   Timed Code Treatment Minutes 40   Total Treatment Time (sum of timed and untimed services) 40

## 2024-05-15 NOTE — PROGRESS NOTES
Splint only visit    05/15/24 0500   Appointment Info   Treating Provider Ana Saenz OTR/L   Visits Used 1   Medical Diagnosis Left basilar joint arthroplasty   OT Tx Diagnosis Thumn pain, weakness, and limited ROM.   Orthotics   Orthotic Assessment, Initial session minutes (24172) 75   Type of Orthotic Forearm based thumb spica   Wear Schedule All the time. May remove for hygiene and skin checks.   Skilled Intervention Fabricated appropriate splint, educated in wear and care.   Patient Response/Progress Fit well   Wear Schedule Full time   Off for: Hygiene   Eval/Assessments   OT Eval, Low Complexity Minutes (18955) 15   Education   Learner/Method Patient   Plan   Plan for next session Follow-up for eval after MD appointment   Total Session Time   Timed Code Treatment Minutes 40   Total Treatment Time (sum of timed and untimed services) 41

## 2024-05-17 ENCOUNTER — OFFICE VISIT (OUTPATIENT)
Dept: ORTHOPEDICS | Facility: OTHER | Age: 54
End: 2024-05-17
Attending: SPECIALIST
Payer: COMMERCIAL

## 2024-05-17 DIAGNOSIS — M18.12 ARTHRITIS OF CARPOMETACARPAL (CMC) JOINT OF LEFT THUMB: Primary | ICD-10-CM

## 2024-05-17 PROCEDURE — 99024 POSTOP FOLLOW-UP VISIT: CPT | Performed by: SPECIALIST

## 2024-05-17 NOTE — PROGRESS NOTES
Subjective:    Patient returns for follow-up status post left thumb basal joint reconstruction date of surgery was 5/10/2024 he is back today doing well.      Objective:    Incisions healing nicely there is no evidence of infection sensation is intact  Imaging:     No new imaging  Assessment:    1 week status post left thumb basal joint reconstruction    Plan:    He is already been to therapy and splint has been fabricated and fits well.  He will be continue with therapy I will reassess in 4 weeks time sooner if any problems occur.    Richie Tam MD

## 2024-05-21 ENCOUNTER — THERAPY VISIT (OUTPATIENT)
Dept: OCCUPATIONAL THERAPY | Facility: OTHER | Age: 54
End: 2024-05-21
Attending: SPECIALIST
Payer: COMMERCIAL

## 2024-05-21 DIAGNOSIS — M18.9 OSTEOARTHRITIS OF BASILAR JOINT OF THUMB: Primary | ICD-10-CM

## 2024-05-21 PROCEDURE — 97035 APP MDLTY 1+ULTRASOUND EA 15: CPT | Mod: GO | Performed by: OCCUPATIONAL THERAPIST

## 2024-05-21 PROCEDURE — 97110 THERAPEUTIC EXERCISES: CPT | Mod: GO | Performed by: OCCUPATIONAL THERAPIST

## 2024-05-21 PROCEDURE — 97165 OT EVAL LOW COMPLEX 30 MIN: CPT | Mod: GO | Performed by: OCCUPATIONAL THERAPIST

## 2024-05-21 NOTE — PROGRESS NOTES
OCCUPATIONAL THERAPY EVALUATION  Type of Visit: Evaluation    See electronic medical record for Abuse and Falls Screening details.    Subjective  Pt is 11 days post op. His stitches have been removed and he is wearing his custom based orthosis.  Pt reports he has not been using his hand for fingers. He reports mild pain. He is not taking narcotics for pain      Presenting condition or subjective complaint:    Date of onset: 05/10/24    Relevant medical history:   Reviewed  Dates & types of surgery:  5/10/2024      Prior Level of Function  Transfers: Independent  Ambulation: Independent  ADL: Independent  IADL: Driving, Finances, Housekeeping, Work, Yard work    Patient goals for therapy:  To get full strength and motion in his left hand    Pain assessment: See objective evaluation for additional pain details     Assessment & Plan   CLINICAL IMPRESSIONS  Medical Diagnosis: S/P basilar joint arthoplasty    Treatment Diagnosis: ipaired self cares due precautions, weakness, decrease ROM    Impression/Assessment: Pt is a 53 year old male presenting to Occupational Therapy due to S/P basilar joint arthoplasty .  The following significant findings have been identified: Impaired ROM, Impaired strength, Pain, and Post-surgical precautions.  These identified deficits interfere with their ability to perform self care tasks and work tasks as compared to previous level of function.   Patient's limitations or Problem List includes: Pain, Decreased ROM/motion, Weakness, Decreased , Decreased pinch, Decreased coordination, and Decreased dexterity of the left thumb which interferes with the patient's ability to perform Work Tasks as compared to previous level of function.    Clinical Decision Making (Complexity):  Assessment of Occupational Performance: 1-3 Performance Deficits  Occupational Performance Limitations: bathing/showering, toileting, dressing, and work  Clinical Decision Making (Complexity): Low complexity    PLAN OF  CARE  Treatment Interventions:  Modalities: Hot Pack, Ultrasound  Interventions: Therapeutic Activity, Therapeutic Exercise, Manual Therapy, Orthotic Fitting/Training    Long Term Goals   OT Goal 1  Goal Identifier: HEP  Goal Description: PT will be independent in HEP  Rationale: In order to maximize safety and independence with performance of self-care activities  Target Date: 06/18/24  OT Goal 2  Goal Identifier: thumb ROM  Goal Description: Pt will have Kapanji score of 9/10 or better  Rationale: In order to maximize safety and independence with performance of self-care activities  Target Date: 07/16/24  OT Goal 3  Goal Identifier: hand strength  Goal Description: Pt will have  strength of 50# or greater  Rationale: In order to maximize safety and independence with performance of self-care activities  Target Date: 08/13/24      Frequency of Treatment: 0-2x/week  Duration of Treatment: 8weeks     Recommended Referrals to Other Professionals:   Education Assessment: Learner/Method: Patient;Listening;Reading;Demonstration;Pictures/Video;No Barriers to Learning     Risks and benefits of evaluation/treatment have been explained.   Patient/Family/caregiver agrees with Plan of Care.     Evaluation Time:    OT Eval, Low Complexity Minutes (66931): 15       Signing Clinician: Claudia Parker OT

## 2024-05-23 PROBLEM — M18.9 OSTEOARTHRITIS OF BASILAR JOINT OF THUMB: Status: ACTIVE | Noted: 2024-05-23

## 2024-06-05 ENCOUNTER — THERAPY VISIT (OUTPATIENT)
Dept: OCCUPATIONAL THERAPY | Facility: OTHER | Age: 54
End: 2024-06-05
Attending: SPECIALIST
Payer: COMMERCIAL

## 2024-06-05 DIAGNOSIS — M19.032 LOCALIZED PRIMARY OSTEOARTHRITIS OF CARPOMETACARPAL (CMC) JOINT OF LEFT WRIST: Primary | ICD-10-CM

## 2024-06-05 PROCEDURE — 97110 THERAPEUTIC EXERCISES: CPT | Mod: GO

## 2024-06-05 PROCEDURE — 97763 ORTHC/PROSTC MGMT SBSQ ENC: CPT | Mod: GO,XU

## 2024-06-05 PROCEDURE — 97035 APP MDLTY 1+ULTRASOUND EA 15: CPT | Mod: GO

## 2024-06-17 ENCOUNTER — THERAPY VISIT (OUTPATIENT)
Dept: OCCUPATIONAL THERAPY | Facility: OTHER | Age: 54
End: 2024-06-17
Attending: SPECIALIST
Payer: COMMERCIAL

## 2024-06-17 DIAGNOSIS — M19.032 LOCALIZED PRIMARY OSTEOARTHRITIS OF CARPOMETACARPAL (CMC) JOINT OF LEFT WRIST: Primary | ICD-10-CM

## 2024-06-17 PROCEDURE — 97110 THERAPEUTIC EXERCISES: CPT | Mod: GO | Performed by: OCCUPATIONAL THERAPIST

## 2024-06-17 PROCEDURE — 97140 MANUAL THERAPY 1/> REGIONS: CPT | Mod: GO | Performed by: OCCUPATIONAL THERAPIST

## 2024-06-17 PROCEDURE — 97035 APP MDLTY 1+ULTRASOUND EA 15: CPT | Mod: GO | Performed by: OCCUPATIONAL THERAPIST

## 2024-06-24 ENCOUNTER — THERAPY VISIT (OUTPATIENT)
Dept: OCCUPATIONAL THERAPY | Facility: OTHER | Age: 54
End: 2024-06-24
Attending: SPECIALIST
Payer: COMMERCIAL

## 2024-06-24 DIAGNOSIS — M19.032 LOCALIZED PRIMARY OSTEOARTHRITIS OF CARPOMETACARPAL (CMC) JOINT OF LEFT WRIST: Primary | ICD-10-CM

## 2024-06-24 PROCEDURE — 97110 THERAPEUTIC EXERCISES: CPT | Mod: GO | Performed by: OCCUPATIONAL THERAPIST

## 2024-06-24 PROCEDURE — 97035 APP MDLTY 1+ULTRASOUND EA 15: CPT | Mod: GO | Performed by: OCCUPATIONAL THERAPIST

## 2024-06-25 NOTE — PROGRESS NOTES
DISCHARGE: Keegan has been educated in HEP to improve hand strength. His wrist and thumb ROM is WNL. He is expected to continue to improve hand strength and use with out difficulty. He will call and schedule further sessions if difficulties arise.   Reason for Discharge: Patient chooses to discontinue therapy.       Discharge Plan: Patient to continue home program.    Referring Provider:  Richie Tam         06/24/24 0500   Appointment Info   Treating Provider Claudia Parker OTR/L   Visits Used 4   Medical Diagnosis S/P basilar joint arthoplasty   OT Tx Diagnosis impaired  Work and self cares due precautions, weakness, decrease ROM   Progress Note/Certification   Onset of Illness/Injury or Date of Surgery 05/10/24   Therapy Frequency 0-2x/week   Predicted Duration 8weeks   Progress Note Completed Date 05/21/24   OT Goal 1   Goal Identifier HEP   Goal Description PT will be independent in HEP   Rationale In order to maximize safety and independence with performance of self-care activities   Goal Progress Goal met   Target Date 06/18/24   Date Met 06/24/24   OT Goal 2   Goal Identifier thumb ROM   Goal Description Pt will have Kapanji score of 9/10 or better   Rationale In order to maximize safety and independence with performance of self-care activities   Target Date 07/16/24   Goal Progress goal met   Date Met 06/24/24   OT Goal 3   Goal Identifier hand strength   Goal Description Pt will have  strength of 50# or greater   Rationale In order to maximize safety and independence with performance of self-care activities   Target Date 08/13/24   Goal Progress progressing. currently 47#   Subjective Report   Subjective Report He denies any pain or difficulty with movement and use.  He has been slowly transitioning out of a splinting using his hand for daily activities with no increase in pain.  He would like this to be his last appointment.  He feels he can continue strengthening and use independently.    Objective Measure 1   Objective Measure wrist ROM   Details flex/ext:   68/48  UD/RD 0/10 Kapandji 10/10   Objective Measure 2   Objective Measure hand strength   Details : Rt: 107 Lt:  47  Lateral: Rt:35  Lt: 9  Three point: Rt   30 Lt 7   Ultrasound   Ultrasound, Minutes (16633) 8 Minutes   Ultrasound -Type (does not include 3-5 min prep) Pulsed 20%;5 cm sound head   Intensity .8 w/cm2   Duration (does not include the 3-5 min set up/clean up time) 8 min   Frequency 3 MHz   Location thenar and basilar joint   Positioning sitting   Patient Response/Progress small area of swelling in dorsal web space.   Therapeutic Procedure/Exercise   Therapeutic Procedure: strength, endurance, ROM, flexibillity minutes (22200) 32   Ther Proc 1 measurements taken, educated patient stabilization for stenosis strength..  Educated patient activities and use of hand performed.  Patient patient to use as needed for heavy lifting activities and if pain increases.  Patient was educated in spinal squeezes in pronation supination and neutral wrist.  Patient will follow with hold for 3 to 5 seconds in all positions.   Skilled Intervention educate in anatomy of surgery, restrictions, progression,   Patient Response/Progress Patient tolerated well.   Manual Therapy   Manual Therapy 1 IASTM to scars and radial side of wrist to free any adhesions, scars are well healed. proximal scar is slightly tender.   Skilled Intervention tissue remodeling.   Patient Response/Progress sligth tenderness at proximal scar.   Orthotics   Type of Orthotic Comfort cool   Wear Schedule Patient reports he hasn't been wearing his splint much as he can't work with it on. I educated him on the importance of wearing the custom splint. I also, gave him a comfort cool this date as it would be better than wearing no splint but again emphasized that he should be wearing the custom splint.   Wear Schedule Full time   Off for: Hygiene   Skilled Intervention Educated in  donning/doffing and wear and care.   Education   Learner/Method Patient;Listening;Reading;Demonstration;Pictures/Video;No Barriers to Learning   Plan   Home program Access Code: [email or web address]  URL: [email or web address]  Date: 05/21/2024  Prepared by: Claudia Parker    Exercises  - Hand AROM Tendon Gliding Series  - 1 x daily - 7 x weekly - 3 sets - 10 reps  - Finger Extension or EDC Glides: Pen Roll From Fist to Hook Fist  - 1 x daily - 7 x weekly - 3 sets - 10 reps   Plan for next session progress as tolerated per protocol.   Total Session Time   Timed Code Treatment Minutes 40   Total Treatment Time (sum of timed and untimed services) 40

## 2024-07-13 ENCOUNTER — HEALTH MAINTENANCE LETTER (OUTPATIENT)
Age: 54
End: 2024-07-13

## 2024-07-19 ENCOUNTER — OFFICE VISIT (OUTPATIENT)
Dept: ORTHOPEDICS | Facility: OTHER | Age: 54
End: 2024-07-19
Attending: SPECIALIST
Payer: COMMERCIAL

## 2024-07-19 DIAGNOSIS — M18.12 ARTHRITIS OF CARPOMETACARPAL (CMC) JOINT OF LEFT THUMB: Primary | ICD-10-CM

## 2024-07-19 PROCEDURE — 99024 POSTOP FOLLOW-UP VISIT: CPT | Performed by: SPECIALIST

## 2024-07-19 NOTE — PROGRESS NOTES
Subjective:    Patient returns for follow-up 10 weeks status post a basal joint reconstruction on the left.  His symptoms improved but he continues to note some mild swelling.    Objective:    Exam shows no evidence of tenderness with palpation over the basilar joint range of motion is well-preserved  Imaging:     No new imaging  Assessment:    10 weeks status post basal joint reconstruction doing well    Plan:    Logan activities follow-up as needed    Richie Tam MD

## 2025-05-28 ENCOUNTER — TELEPHONE (OUTPATIENT)
Dept: FAMILY MEDICINE | Facility: OTHER | Age: 55
End: 2025-05-28
Payer: COMMERCIAL

## 2025-05-28 NOTE — TELEPHONE ENCOUNTER
Dr DE LEON-patient is looking two medications that were to be sent to [facility] in [place]     Please call and advise    Thank You    Nikki Andres on 5/28/2025 at 3:49 PM

## 2025-05-28 NOTE — TELEPHONE ENCOUNTER
Left message on patients VM that Pharmacy confirmed RX's at 2:52 pm. If he has any other questions he can call us back.  Mirna Hoff LPN on 5/28/2025 at 3:56 PM  EXT. [phone]

## 2025-06-10 DIAGNOSIS — I10 ESSENTIAL HYPERTENSION: ICD-10-CM

## 2025-06-12 ENCOUNTER — MYC REFILL (OUTPATIENT)
Dept: FAMILY MEDICINE | Facility: OTHER | Age: 55
End: 2025-06-12
Payer: COMMERCIAL

## 2025-06-12 DIAGNOSIS — I10 ESSENTIAL HYPERTENSION: ICD-10-CM

## 2025-06-16 RX ORDER — LOSARTAN POTASSIUM 25 MG/1
25 TABLET ORAL DAILY
Qty: 90 TABLET | Refills: 3 | OUTPATIENT
Start: 2025-06-16

## 2025-06-16 RX ORDER — LOSARTAN POTASSIUM 25 MG/1
TABLET ORAL
Refills: 0 | OUTPATIENT
Start: 2025-06-16

## 2025-06-16 NOTE — TELEPHONE ENCOUNTER
[facility] - [address] [phone]  sent Rx request for the following:      Requested Prescriptions   Pending Prescriptions Disp Refills    losartan (COZAAR) 25 MG tablet 90 tablet 3     Sig: Take 1 tablet (25 mg) by mouth daily.       Angiotensin-II Receptors Failed - 6/16/2025  3:25 PM        Failed - Has GFR on file in past 12 months and most recent value is normal        Failed - Normal serum potassium on file in past 12 months     Recent Labs   Lab Test 01/10/24  1110   POTASSIUM 3.9       Last Prescription Date:   5/16/2025  Last Fill Qty/Refills:         90, R-3    Last Office Visit:              5/16/2025   Future Office visit:            none      Unable to complete prescription refill per RN Medication Refill Policy. Redundant refill request refused: Too soon: rx was sent to [facility] [place]. Tin Govea RN on 6/16/2025 at 3:27 PM

## (undated) DEVICE — GLOVE PROTEXIS PI ORTHO PF 8.5 2D73HT76

## (undated) DEVICE — Device

## (undated) DEVICE — BNDG ELASTIC 4" DBL LENGTH UNSTERILE 6611-14

## (undated) DEVICE — DRSG XEROFORM 1X8"

## (undated) DEVICE — DRAPE SHEET REV FOLD 3/4 9349

## (undated) DEVICE — PACK KNEE ARTHROSCOPY CUSTOM SOP15KAFCA

## (undated) DEVICE — BUR ARTHREX COOLCUT DISSECTOR 3.5MM  AR-8350DS

## (undated) DEVICE — SLEEVE COMPRESSION SCD KNEE MED 74022

## (undated) DEVICE — GLOVE PROTEXIS PI ORTHO PF 8.0 2D73HT80

## (undated) DEVICE — TOURNIQUET SGL  BLADDER 30"X4" BLUE 5921030135

## (undated) DEVICE — CAST PADDING 2" COTTON WEBRIL UNSTERILE 9082

## (undated) DEVICE — CAST PADDING 4" COTTON WEBRIL UNSTERILE 9084

## (undated) DEVICE — CAST PLASTER SPLINT 4X15" 7394

## (undated) DEVICE — BNDG ELASTIC 4"X5YDS UNSTERILE 6611-40

## (undated) DEVICE — GLOVE BIOGEL 7.5 LATEX

## (undated) DEVICE — KIT ARTHROSCOPIC SURGICAL PROCEDURE DISP AR-8990DS

## (undated) DEVICE — DISTRACTOR STRAP ANKLE ARTHRO AR-1712

## (undated) DEVICE — ABLATOR ARTHREX APOLLO RF ASPIRATING 50DEG AR-9815

## (undated) DEVICE — ESU GROUND PAD ADULT W/CORD E7507

## (undated) DEVICE — TUBING ARTHRO CONMED/LINVATEC PUMP BLUE INFLOW 10K100

## (undated) DEVICE — COVER LIGHT HANDLE LT-F02

## (undated) DEVICE — ANTIFOG SOLUTION W/FOAM PAD CF-1001

## (undated) DEVICE — GEL ULTRASOUND AQUASONIC 20GM 01-01

## (undated) DEVICE — TUBING SUCTION 10'X3/16" N510

## (undated) DEVICE — DRSG GAUZE 4X4" 3033

## (undated) DEVICE — BLADE SAW SAGITTAL STRK MICRO 9.0X25X0.38MM 2296-003-511

## (undated) DEVICE — TOURNIQUET SGL BLADDER 18"X4" RED 5921-218-135

## (undated) DEVICE — SOL WATER 1500ML

## (undated) DEVICE — GLOVE SENSICARE 8.5 MSG1085 LATEX FREE

## (undated) DEVICE — DRILL BIT SYN QUICK COUPLING 3.5X110MM 310.35

## (undated) DEVICE — SU VICRYL 2-0 SH 27" UND J417H

## (undated) DEVICE — SYR BULB IRRIG 50ML LATEX FREE 0035280

## (undated) DEVICE — CAST PLASTER SPLINT 5X30" 7395

## (undated) DEVICE — PREP CHLORAPREP 26ML TINTED ORANGE  260815

## (undated) DEVICE — GLOVE BIOGEL PI INDICATOR 8.0 LF 41680

## (undated) DEVICE — BLADE KNIFE SURG 15 371115

## (undated) DEVICE — DRAPE STERI U 1015

## (undated) DEVICE — DRAPE STERI TOWEL LG 1010

## (undated) DEVICE — DRSG JUMPSTART ANTIMICROBIAL 1.5X8" ABS-4005

## (undated) DEVICE — SU TICRON 3-0 CV-331 3271-41

## (undated) DEVICE — PACK MAJOR EXTREMITY SOP15MEFCA

## (undated) DEVICE — DRSG GAUZE 4X4" TRAY 6939

## (undated) DEVICE — SUTURE 3-0 PROLENE FS-1 631G

## (undated) DEVICE — GLOVE ESTEEM POWDER FREE SMT 8.5 2D72PT85

## (undated) DEVICE — SU ETHILON 4-0 FS-2 18" 662H

## (undated) RX ORDER — CEFAZOLIN SODIUM/WATER 2 G/20 ML
SYRINGE (ML) INTRAVENOUS
Status: DISPENSED
Start: 2024-05-10

## (undated) RX ORDER — FENTANYL CITRATE 50 UG/ML
INJECTION, SOLUTION INTRAMUSCULAR; INTRAVENOUS
Status: DISPENSED
Start: 2020-09-03

## (undated) RX ORDER — PROPOFOL 10 MG/ML
INJECTION, EMULSION INTRAVENOUS
Status: DISPENSED
Start: 2024-05-10

## (undated) RX ORDER — LIDOCAINE HYDROCHLORIDE 20 MG/ML
INJECTION, SOLUTION EPIDURAL; INFILTRATION; INTRACAUDAL; PERINEURAL
Status: DISPENSED
Start: 2020-09-03

## (undated) RX ORDER — LIDOCAINE HYDROCHLORIDE 10 MG/ML
INJECTION, SOLUTION INFILTRATION; PERINEURAL
Status: DISPENSED
Start: 2024-01-04

## (undated) RX ORDER — DEXAMETHASONE SODIUM PHOSPHATE 4 MG/ML
INJECTION, SOLUTION INTRA-ARTICULAR; INTRALESIONAL; INTRAMUSCULAR; INTRAVENOUS; SOFT TISSUE
Status: DISPENSED
Start: 2020-09-03

## (undated) RX ORDER — DILTIAZEM HYDROCHLORIDE 5 MG/ML
INJECTION INTRAVENOUS
Status: DISPENSED
Start: 2022-10-03

## (undated) RX ORDER — TRIAMCINOLONE ACETONIDE 40 MG/ML
INJECTION, SUSPENSION INTRA-ARTICULAR; INTRAMUSCULAR
Status: DISPENSED
Start: 2024-01-04

## (undated) RX ORDER — DEXAMETHASONE SODIUM PHOSPHATE 10 MG/ML
INJECTION, SOLUTION INTRAMUSCULAR; INTRAVENOUS
Status: DISPENSED
Start: 2020-09-03

## (undated) RX ORDER — LIDOCAINE HYDROCHLORIDE 10 MG/ML
INJECTION, SOLUTION EPIDURAL; INFILTRATION; INTRACAUDAL; PERINEURAL
Status: DISPENSED
Start: 2020-09-03

## (undated) RX ORDER — PROPOFOL 10 MG/ML
INJECTION, EMULSION INTRAVENOUS
Status: DISPENSED
Start: 2020-09-03

## (undated) RX ORDER — CEFAZOLIN SODIUM 2 G/100ML
INJECTION, SOLUTION INTRAVENOUS
Status: DISPENSED
Start: 2020-09-03

## (undated) RX ORDER — ONDANSETRON 2 MG/ML
INJECTION INTRAMUSCULAR; INTRAVENOUS
Status: DISPENSED
Start: 2024-05-10

## (undated) RX ORDER — DILTIAZEM HYDROCHLORIDE 5 MG/ML
INJECTION INTRAVENOUS
Status: DISPENSED
Start: 2024-01-10

## (undated) RX ORDER — ONDANSETRON 2 MG/ML
INJECTION INTRAMUSCULAR; INTRAVENOUS
Status: DISPENSED
Start: 2020-09-03

## (undated) RX ORDER — TRIAMCINOLONE ACETONIDE 40 MG/ML
INJECTION, SUSPENSION INTRA-ARTICULAR; INTRAMUSCULAR
Status: DISPENSED
Start: 2024-02-22

## (undated) RX ORDER — BUPIVACAINE HYDROCHLORIDE 5 MG/ML
INJECTION, SOLUTION EPIDURAL; INTRACAUDAL
Status: DISPENSED
Start: 2024-05-10

## (undated) RX ORDER — ACETAMINOPHEN 10 MG/ML
INJECTION, SOLUTION INTRAVENOUS
Status: DISPENSED
Start: 2020-09-03

## (undated) RX ORDER — FENTANYL CITRATE 50 UG/ML
INJECTION, SOLUTION INTRAMUSCULAR; INTRAVENOUS
Status: DISPENSED
Start: 2024-05-10

## (undated) RX ORDER — DILTIAZEM HCL/D5W 125 MG/125
PLASTIC BAG, INJECTION (ML) INTRAVENOUS
Status: DISPENSED
Start: 2024-01-10

## (undated) RX ORDER — ADENOSINE 3 MG/ML
INJECTION, SOLUTION INTRAVENOUS
Status: DISPENSED
Start: 2022-10-03

## (undated) RX ORDER — LIDOCAINE HYDROCHLORIDE 10 MG/ML
INJECTION, SOLUTION INFILTRATION; PERINEURAL
Status: DISPENSED
Start: 2024-02-22

## (undated) RX ORDER — DILTIAZEM HYDROCHLORIDE 120 MG/1
CAPSULE, COATED, EXTENDED RELEASE ORAL
Status: DISPENSED
Start: 2024-01-10

## (undated) RX ORDER — KETOROLAC TROMETHAMINE 30 MG/ML
INJECTION, SOLUTION INTRAMUSCULAR; INTRAVENOUS
Status: DISPENSED
Start: 2020-09-03

## (undated) RX ORDER — BUPIVACAINE HYDROCHLORIDE 5 MG/ML
INJECTION, SOLUTION EPIDURAL; INTRACAUDAL
Status: DISPENSED
Start: 2020-09-03

## (undated) RX ORDER — BUPIVACAINE HYDROCHLORIDE 2.5 MG/ML
INJECTION, SOLUTION EPIDURAL; INFILTRATION; INTRACAUDAL
Status: DISPENSED
Start: 2020-09-03

## (undated) RX ORDER — EPINEPHRINE 1 MG/ML(1)
AMPUL (ML) INJECTION
Status: DISPENSED
Start: 2020-09-03